# Patient Record
Sex: MALE | Employment: FULL TIME | URBAN - METROPOLITAN AREA
[De-identification: names, ages, dates, MRNs, and addresses within clinical notes are randomized per-mention and may not be internally consistent; named-entity substitution may affect disease eponyms.]

---

## 2021-08-16 ENCOUNTER — TRANSCRIBE ORDER (OUTPATIENT)
Dept: SCHEDULING | Age: 46
End: 2021-08-16

## 2021-08-16 DIAGNOSIS — M54.2 NECK PAIN: Primary | ICD-10-CM

## 2021-08-24 PROBLEM — M77.12 LEFT LATERAL EPICONDYLITIS: Status: ACTIVE | Noted: 2021-08-24

## 2021-09-18 ENCOUNTER — HOSPITAL ENCOUNTER (OUTPATIENT)
Dept: MRI IMAGING | Age: 46
Discharge: HOME | End: 2021-09-18
Attending: PHYSICIAN ASSISTANT
Payer: COMMERCIAL

## 2021-09-18 DIAGNOSIS — M54.2 NECK PAIN: ICD-10-CM

## 2021-09-18 PROCEDURE — 72141 MRI NECK SPINE W/O DYE: CPT

## 2021-10-05 PROBLEM — F32.9 MDD (MAJOR DEPRESSIVE DISORDER): Status: ACTIVE | Noted: 2017-01-24

## 2021-10-05 PROBLEM — M50.30 DDD (DEGENERATIVE DISC DISEASE), CERVICAL: Status: ACTIVE | Noted: 2021-10-05

## 2021-10-05 PROBLEM — M77.12 LEFT LATERAL EPICONDYLITIS: Status: RESOLVED | Noted: 2021-08-24 | Resolved: 2021-10-05

## 2021-10-05 PROBLEM — M51.34 DDD (DEGENERATIVE DISC DISEASE), THORACIC: Status: ACTIVE | Noted: 2021-10-05

## 2021-10-05 PROBLEM — M51.37 DDD (DEGENERATIVE DISC DISEASE), LUMBOSACRAL: Status: ACTIVE | Noted: 2021-10-05

## 2021-10-05 PROBLEM — K21.9 GERD (GASTROESOPHAGEAL REFLUX DISEASE): Status: ACTIVE | Noted: 2017-03-01

## 2021-10-05 PROBLEM — E11.9 DM2 (DIABETES MELLITUS, TYPE 2) (HCC): Status: ACTIVE | Noted: 2017-08-28

## 2021-11-24 ENCOUNTER — HOSPITAL ENCOUNTER (OUTPATIENT)
Dept: PHYSICAL THERAPY | Age: 46
Discharge: HOME | End: 2021-11-24
Attending: ORTHOPAEDIC SURGERY
Payer: COMMERCIAL

## 2021-11-24 DIAGNOSIS — M25.511 RIGHT SHOULDER PAIN, UNSPECIFIED CHRONICITY: ICD-10-CM

## 2021-11-24 DIAGNOSIS — M75.101 TEAR OF RIGHT ROTATOR CUFF, UNSPECIFIED TEAR EXTENT, UNSPECIFIED WHETHER TRAUMATIC: ICD-10-CM

## 2021-11-24 PROCEDURE — 97162 PT EVAL MOD COMPLEX 30 MIN: CPT

## 2021-11-24 PROCEDURE — 97110 THERAPEUTIC EXERCISES: CPT

## 2021-11-24 NOTE — PROGRESS NOTES
Macario Decker  : 1975  Payor: Ginette Cardona / Plan: [facility] / Product Type: PPO /  [address] [facility] at [address] [place] [address] [address] [place] [address], [facility], [address] [place] [address] Road  [phone]   [phone]                                                          Sheryl Montilla MD      OUTPATIENT PHYSICAL THERAPY: Daily Treatment Note 2021 Visit Count:  1    Tx Diagnosis:  Pain in Right Shoulder (M25.511)      Pre-treatment Symptoms/Complaints: See Initial Eval Dated 21 for more details. Pain: Initial:3/10  Medications Last Reviewed:  2021     Post Session: 3/10   Updated Objective Findings: See Initial Eval for more details. TREATMENT:   THERAPEUTIC EXERCISE: (15 minutes):  Exercises per grid below to improve mobility, strength and balance. Required minimal visual, verbal and manual cues to promote proper body alignment and promote proper body posture. Progressed resistance and complexity of movement as indicated. Date:  21 Date:   Date:     Activity/Exercise Parameters Parameters Parameters   Education HEP, POC, PT goals, anatomy/pathology     Rows [id number] lbs     scap retract with ER 3x10, blue band     Bicep curls with scap setting 3x10x8 lbs     Shoulder taps 3x10, EOB                       THERAPEUTIC ACTIVITY: ( 0 minutes): Activities per gid below to improve functional movement related mobility, strength and balance to improve neuro-muscular carryover to daily functional activities for improving patient's quality of life. Required visual, verbal and manual cues to promote proper body alignment and promote proper body posture/mechanics. Progressed resistance and complexity of movement as indicated.      Date:   Date:   Date:     Activity/Exercise Parameters Parameters Parameters                                                                               MANUAL THERAPY: (0 minutes): Joint mobilization, Soft tissue mobilization was utilized and necessary because of the patient's restricted joint motion and restricted motion of soft tissue mobility. Date  11/24/2021    Technique Used Grade  Level # Time(s) Effect while being performed                                                                 HEP Log Date 1.       2.     3.    4.    5.           [employer] Portal  Treatment/Session Summary:    Response to Treatment: Pt demonstrated understanding of POC and initial HEP. No increase in pain or adverse reactions. Communication/Consultation:  POC, HEP, PT goals, Faxed initial evaluation to MD.   Equipment provided today: HEP Handout   Recommendations/Intent for next treatment session:   Next visit will focus on Pain Science Education RTC strengthening periscapular strengthening, shoulder stability. Treatment Plan of Care Effective Dates: 11/24/2021 TO 1/23/2022 (60 days).   Frequency/Duration: 2 times a week for 60 Days             Total Treatment Billable Duration:   15  Rx plus Eval   PT Patient Time In/Time Out  Time In: 1640  Time Out: [address], PT    Future Appointments   Date Time Provider Ana M García   11/30/2021  4:30 PM [facility] Atkins, [facility]   12/6/2021  3:20 PM Miriam Trejo MD [facility] PRE PRE   12/7/2021  4:30 PM [facility]   12/9/2021  5:15 PM Thierno Atkins, PT [facility]   12/14/2021  4:30 PM Thierno Atkins, PT [facility]   12/21/2021  4:30 PM Thierno Atkins, PT [facility]   12/23/2021  4:30 PM Thierno Atkins, PT [facility]   12/28/2021  5:15 PM Thierno Atkins, PT [facility]   1/4/2022  9:00 AM MD GIOVANI Shields POA   1/4/2022  3:15 PM MD JUSTA Dumont

## 2021-11-24 NOTE — THERAPY EVALUATION
Tiff Grimm  : 1975      Payor: Alexander Ramirez / Plan: [facility] / Product Type: PPO /    [address], [place], [address] [place] [address] Road  [phone]   [phone]              OUTPATIENT PHYSICAL THERAPY:Initial Assessment: 2021    ICD-10: Treatment Diagnosis:   Pain in Right Shoulder (M25.511)              Precautions/Allergies:   Metformin   Fall Risk Score: 1 (? 5 = High Risk)  MD Orders: Eval and Treat  MEDICAL/REFERRING DIAGNOSIS:  Right shoulder pain, unspecified chronicity [M25.511]  Tear of right rotator cuff, unspecified tear extent, unspecified whether traumatic [M75.101]   DATE OF ONSET: 2020  REFERRING PHYSICIAN: Wendy Nelson MD  RETURN PHYSICIAN APPOINTMENT: TBD     INITIAL ASSESSMENT:   Mr. Ana Hernandes presents to physical therapy with decreased strength, ROM, joint mobility, functional mobility. These S/S are consistent with R rotator cuff tear, with features of instability. Patient will benefit from skilled physical therapy for manual therapeutic techniques (as appropriate), therapeutic exercises and activities, neuromuscular re-education, and comprehensive home exercises program to address current impairments and functional limitations. Tiff Grimm will benefit from skilled PT (medically necessary) in order to address above deficits affecting participation in basic ADLs and overall functional tolerance. PROBLEM LIST (Impacting functional limitations):  · Decreased Strength  · Decreased ADL/Functional Activities  · Increased Pain  · Decreased Activity Tolerance  · Increased Shortness of Breath  · Decreased Flexibility/Joint Mobility  · Decreased [place] with Home Exercise Program INTERVENTIONS PLANNED:  1. Cold  2. Family Education  3. Home Exercise Program (HEP)  4. Manual Therapy  5. Neuromuscular Re-education/Strengthening  6. Range of Motion (ROM)  7.  Therapeutic Activites  8. Therapeutic Exercise/Strengthening  9. Transfer Training  10. Ultrasound   TREATMENT PLAN:  Effective Dates: 11/24/2021 TO 1/23/2022 (60 days). Frequency/Duration: 2 times a week for 60 Days  GOALS: (Goals have been discussed and agreed upon with patient.)     Short-Term Goals~4 weeks  Goal Met   1. Kelley Lopez will report <=1/10 pain with don/doffing clothing as well as minimal/no difficulty. 1.  [] Date:   4. Kelley Lopez will show a greater than 8 point decrease on the DASH in order to show an increase in upper extremity function. 4.  [] Date:   5. Pt will improve R shoulder ER strength to 4/5 to improve ability to lift, carry, and throw for work. 5.  [] Date:   6.  6.  [] Date:         Long Term Goals~8 weeks Goal Met   1. Kelley Lopez will lift at least 75 lbs from floor to waist to demonstrate improved functional strength. 1.  [] Date:   2. Kelley Lopez will show a greater than 16 point decrease on the DASH in order to show an increase in upper extremity function 2. [] Date:   3. Kelley Lopez will press at least 30 lbs overhead to promote return to work duties. 3.  [] Date:   4.  4.  [] Date:            Outcome Measure: Tool Used: Disabilities of the Arm, Shoulder and Hand (DASH) Questionnaire - Quick Version  Score:  Initial: 31/55  Most Recent: X/55 (Date: -- )   Interpretation of Score: The DASH is designed to measure the activities of daily living in person's with upper extremity dysfunction or pain. Each section is scored on a 1-5 scale, 5 representing the greatest disability. The scores of each section are added together for a total score of 55. Medical Necessity:   · Skilled intervention continues to be required due to deficits and impairments seen upon initial evaluation affecting patient's participation in ADLs and functional tasks.     Reason for Services/Other Comments:  · Patient continues to require skilled intervention due to deficits and impairments seen upon initial evaluation affecting patient's participation in ADLs and functional tasks. Total Treatment Duration:  PT Patient Time In/Time Out  Time In: 1640  Time Out: 1722    Rehabilitation Potential For Stated Goals: good  Regarding Daniela Sierra's therapy, I certify that the treatment plan above will be carried out by a therapist or under their direction. Thank you for this referral,  Roc Powell, PT     Referring Physician Signature: Jazmyn Sanz MD                        HISTORY:   History of Present Injury/Illness (Reason for Referral):  Pt reports history of R shoulder pain starting in 2016. He states the pain in his shoulder improved after taking a break from work but that it started hurting again in 2020. He states he hasn't done much with his shoulder since having to restrict his activity at work due to other injuries. He's noted significant weakness to his shoulder since August 2021. He notes intermittent tingling to his R thumb that appears to coincide with prolonged computer work. Pt also notes constant pain in his neck, along with muscle spasms with heavy work. -Present symptoms/complaints (on day of evaluation)  Pain Scale:   ·   · Worst: 3/10    · Aggravating factors: Lifting, Carrying, Overhead activities, push/pull and throwing  · Relieving factors: rest by side  · Irritability: Medium (Onset of pain is equal to alleviation)    Dominant Side:  right  Past Medical History/Comorbidities:   Mr. Gabbie Chamberlain  has no past medical history on file. Mr. Gabbie Chamberlain  has a past surgical history that includes hx orthopaedic. Social History/Living Environment:      Prior Level of Function/Work/Activity:  Normal  Other Clinical Tests:         MRI Positive for partial RTC tear  Current Medications:    Current Outpatient Medications:     meloxicam (Mobic) 15 mg tablet, Take 1 Tablet by mouth daily for 30 days. , Disp: 30 Tablet, Rfl: 1    empagliflozin-linagliptin (Glyxambi) 25-5 mg tab, 1 tab daily with breakfast, Disp: 90 Tablet, Rfl: 0    losartan (COZAAR) 100 mg tablet, Take 1 Tablet by mouth nightly., Disp: 90 Tablet, Rfl: 0    amLODIPine (NORVASC) 10 mg tablet, Take 1 Tablet by mouth daily. , Disp: 90 Tablet, Rfl: 0    albuterol (PROVENTIL HFA, VENTOLIN HFA, PROAIR HFA) 90 mcg/actuation inhaler, Take 1-2 Puffs by inhalation every six (6) hours as needed for Wheezing., Disp: 1 Each, Rfl: 1    montelukast (SINGULAIR) 10 mg tablet, Take 1 Tablet by mouth daily. , Disp: 90 Tablet, Rfl: 1    aspirin 81 mg chewable tablet, Take 81 mg by mouth daily. Take one daily, Disp: , Rfl:         Ambulatory/Rehab Services H2 Model Falls Risk Assessment    Risk Factors:       (1)  Gender [Male] Ability to Rise from Chair:       (0)  Ability to rise in a single movement    Falls Prevention Plan:       No modifications necessary   Total: (5 or greater = High Risk): 1    ©2010 [facility] of [facility]. All Rights Reserved. [facility] [employer] Patent #[id number]. Federal Law prohibits the replication, distribution or use without written permission from [facility] of [address] [place] [address]       Date Last Reviewed:  11/24/2021   Number of Personal Factors/Comorbidities that affect the Plan of Care: 1-2: MODERATE COMPLEXITY   EXAMINATION:   Observation/Orthostatic Postural Assessment: Forward Head and Rounded Shoulders  Palpation:          No TTP around shoulder or with AC compression    ROM:  Cervical and shoulder ROM full. Subjective weakness felt in R shoulder with aching and popping.    AROM/PROM         Joint: Eval Date: 11/24/2021  Re-Assess Date:  Re-Assess Date:    ACTIVE ROM (standing) RIGHT LEFT RIGHT LEFT RIGHT LEFT   Shoulder Flexion 180 180           Shoulder Abduction 170 170           Shoulder Internal Rotation (Apley's) T2 T2           Shoulder External Rotation (Apley's) TLJ TLJ           Elbow ROM             PASSIVE ROM (supine)             Shoulder Flexion 180 180           Shoulder Abduction 180 180           Shoulder Internal Rotation 90 90           Shoulder External Rotation 90 [facility]                                               Strength:    Joint: Eval Date: 11/24/2021  Re-Assess Date:  Re-Assess Date:     RIGHT LEFT RIGHT LEFT RIGHT LEFT   Shoulder Flexion 4-/5 with pain over AC joint 5/5           Shoulder Abduction  (C5) 4-/5 with pain over AC joint 5/5           Shoulder Internal Rotation 4/5, pain aleksey 5/5           Shoulder External Rotation 4-/5, pain free 4-/5, limited by pain in elbow           Elbow Flexion  (C6) 4/5 5/5           Elbow Extension (C7) 5/5 5-/5           Wrist Flexion (C7) /5 /5           Wrist Extension (C6)             Resisted Thumb Extension/Finger Abduction (C8/T1)             Resisted Cervical Rotation (C1):             Resisted Shoulder Shrug (C2, 3, 4):               Strength                                                        Manual:  Joint Directon Grade Treatment Effect   R GHJ Posterior Inferior II Increased Symptoms - reported apprehension of dislocation, crepitus noted                   Special Tests:     None indicated at IE    Neurological Screen:    Radiating symptoms? Yes=glenohumeral region  Functional Mobility:     able to perform ADLs independently. Reports difficulty lifting objects     Body Structures Involved:  1. Bones  2. Joints  3. Muscles  4. Ligaments Body Functions Affected:  1. Sensory/Pain  2. Neuromusculoskeletal  3. Movement Related Activities and Participation Affected:  1. Mobility  2.  Self Care   Number of elements that affect the Plan of Care: 3: MODERATE COMPLEXITY   CLINICAL PRESENTATION:   Presentation: Evolving clinical presentation with changing clinical characteristics: MODERATE COMPLEXITY   CLINICAL DECISION MAKING:      Use of outcome tool(s) and clinical judgement create a POC that gives a: Questionable prediction of patient's progress: MODERATE COMPLEXITY     See associated treatment note for treatment provided today.     Future Appointments   Date Time Provider Ana M Raquel   11/30/2021  4:30 PM Jakob Hameed, [place] [facility]   12/6/2021  3:20 PM Sheri Douglas MD [facility] PRE PRE   12/7/2021  4:30 PM Jakob Hameed, PT [facility]   12/9/2021  5:15 PM Jakob Hameed, PT [facility]   12/14/2021  4:30 PM Jakob Hameed, PT [facility]   12/21/2021  4:30 PM Jakob Hameed, PT [facility]   12/23/2021  4:30 PM Jakob Hameed, PT [facility]   12/28/2021  5:15 PM Jakob Hameed, PT [facility]   1/4/2022  9:00 AM Susan Hale MD POAP POA   1/4/2022  3:15 PM Francia Duran MD [facility] POAI POA         Bisi Yuen, PT

## 2021-11-30 ENCOUNTER — HOSPITAL ENCOUNTER (OUTPATIENT)
Dept: PHYSICAL THERAPY | Age: 46
Discharge: HOME | End: 2021-11-30
Attending: ORTHOPAEDIC SURGERY
Payer: COMMERCIAL

## 2021-11-30 PROCEDURE — 97110 THERAPEUTIC EXERCISES: CPT

## 2021-11-30 NOTE — PROGRESS NOTES
Kelley Lopez  : 1975  Payor: Coreen Brothers / Plan: [facility] / Product Type: PPO /  [place] at [address] [facility] [address] [place] [address], [facility], [address] [place] [address] Road  [phone]   [phone]                                                          Fritz Elaine MD      OUTPATIENT PHYSICAL THERAPY: Daily Treatment Note 2021 Visit Count:  2    Tx Diagnosis:  Pain in Right Shoulder (M25.511)      Pre-treatment Symptoms/Complaints: Feels like he irritated his shoulder by picking up bags of concrete and digging over the weekend. States it feels like it's back to normal now but he didn't get a chance to try the exercises at home yet. Pain: Initial:3/10  Medications Last Reviewed:  2021     Post Session: 3/10   Updated Objective Findings: See Initial Eval for more details. TREATMENT:   THERAPEUTIC EXERCISE: (46 minutes):  Exercises per grid below to improve mobility, strength and balance. Required minimal visual, verbal and manual cues to promote proper body alignment and promote proper body posture. Progressed resistance and complexity of movement as indicated. Date:  21 Date:  21 Date:     Activity/Exercise Parameters Parameters Parameters   Education HEP, POC, PT goals, anatomy/pathology     Rows [id number] lbs 5m52x02 lbs    scap retract with ER 3x10, blue band 3x10, blue band    Bicep curls with scap setting 3x10x8 lbs 3x10x8 lbs    Shoulder taps 3x10, EOB 3x10, EOB    UBE  4 min forward, 4 min backward; 1 mile    Bazan's carries   15 lbs, 3 large laps    punches  3x10, yellow band for ER iso, standing    Prone y's  With dowel, 3x10    Prone t's  Thumbs up, 3x10    Prone I's  3x10    Lat pull downs, supinated   [id number] lbs    Child's pose lat stretch  3x30 sec          THERAPEUTIC ACTIVITY: ( 0 minutes):  Activities per gid below to improve functional movement related mobility, strength and balance to improve neuro-muscular carryover to daily functional activities for improving patient's quality of life. Required visual, verbal and manual cues to promote proper body alignment and promote proper body posture/mechanics. Progressed resistance and complexity of movement as indicated. Date:   Date:   Date:     Activity/Exercise Parameters Parameters Parameters                                                                               MANUAL THERAPY: (0 minutes): Joint mobilization, Soft tissue mobilization was utilized and necessary because of the patient's restricted joint motion and restricted motion of soft tissue mobility. Date  11/30/2021    Technique Used Grade  Level # Time(s) Effect while being performed                                                                 HEP Log Date 1.       2.     3.    4.    5.           [employer] Portal  Treatment/Session Summary:    Response to Treatment: Continued shoulder stability and periscapular strengthening with good tolerance. Pt most challenged by prone Y's. Pt reported mild increase in pain that eased with reduced load. Communication/Consultation:  POC, HEP, PT goals, Faxed initial evaluation to MD.   Equipment provided today: HEP Handout   Recommendations/Intent for next treatment session:   Next visit will focus on Pain Science Education RTC strengthening periscapular strengthening, shoulder stability. Treatment Plan of Care Effective Dates: 11/30/2021 TO 1/23/2022 (60 days).   Frequency/Duration: 2 times a week for 60 Days             Total Treatment Billable Duration:   43  Rx   PT Patient Time In/Time Out  Time In: 1640  Time Out: Vidya Mcgowan, PT    Future Appointments   Date Time Provider Ana M García   12/1/2021  2:40 PM Carol Contreras NP [facility] POA   12/6/2021  3:20 PM Selena Aleman MD [facility] PRE PRE   12/7/2021  4:45 PM Lawson Holt PT [facility]   12/9/2021  5:15 PM [facility]   12/14/2021  4:45 PM Boaz [facility]ibhavs, [facility]   12/21/2021  4:45 PM Boaz Vaibhavs, PT [facility]   12/23/2021  4:45 PM Boaz Espositos, PT [facility]   12/28/2021  5:15 PM Boaz Mohs, [facility]   1/4/2022  9:00 AM Shayna Herzog MD POAP POA   1/4/2022  3:15 PM Garrison Meehan MD [facility]SA LIGIA POA

## 2021-12-07 ENCOUNTER — APPOINTMENT (OUTPATIENT)
Dept: PHYSICAL THERAPY | Age: 46
End: 2021-12-07
Attending: ORTHOPAEDIC SURGERY
Payer: COMMERCIAL

## 2021-12-09 ENCOUNTER — HOSPITAL ENCOUNTER (OUTPATIENT)
Dept: PHYSICAL THERAPY | Age: 46
Discharge: HOME | End: 2021-12-09
Attending: ORTHOPAEDIC SURGERY
Payer: COMMERCIAL

## 2021-12-09 PROCEDURE — 97110 THERAPEUTIC EXERCISES: CPT

## 2021-12-09 NOTE — PROGRESS NOTES
Tanvi Coma  : 1975  Payor: Axiskatt Houe / Plan: [facility] / Product Type: PPO /  [address] [facility] at [address] [place] [address] [address] [place] [address], [facility], [address] [place] [address] Road  [phone]   [phone]                                                          Tiburcio Houston MD      OUTPATIENT PHYSICAL THERAPY: Daily Treatment Note 2021 Visit Count:  3    Tx Diagnosis:  Pain in Right Shoulder (M25.511)      Pre-treatment Symptoms/Complaints: Feels like his shoulder's about the same. Reports compliance with HEP. Feels like the spot on his side has gotten a little bit better. Pt also states he's having surgery on his R elbow for tennis elbow at some point, but it's not scheduled yet. Pain: Initial:3/10  Medications Last Reviewed:  2021     Post Session: 3/10   Updated Objective Findings: See Initial Eval for more details. TREATMENT:   THERAPEUTIC EXERCISE: (40 minutes):  Exercises per grid below to improve mobility, strength and balance. Required minimal visual, verbal and manual cues to promote proper body alignment and promote proper body posture. Progressed resistance and complexity of movement as indicated.      Date:  21 Date:  21 Date:  21   Activity/Exercise Parameters Parameters Parameters   Education HEP, POC, PT goals, anatomy/pathology     Rows [id number] lbs [id number] lbs    scap retract with ER 3x10, blue band 3x10, blue band    Bicep curls with scap setting 3x10x8 lbs 3x10x8 lbs    Shoulder taps 3x10, EOB 3x10, EOB    UBE  4 min forward, 4 min backward; 1 mile 4 min forward, 4 min backward; 1 mile   Bazan's carries   15 lbs, 3 large laps 20 lbs, 3 large laps   punches  3x10, yellow band for ER iso, standing 3x10, yellow band for ER iso, standing   Prone y's  With dowel, 3x10 With dowel, 3x10   Prone t's  Thumbs up, 3x10 Thumbs up, 3x10x2 lbs   Prone I's  3x10 3x10   Lat pull downs, supinated   [id number] lbs [id number] lbs Child's pose lat stretch  3x30 sec    Wall walks    2x10, with ER iso   Rhythmic stabilization   Supine, 3 way, 2x10, blue tubing         THERAPEUTIC ACTIVITY: ( 0 minutes): Activities per gid below to improve functional movement related mobility, strength and balance to improve neuro-muscular carryover to daily functional activities for improving patient's quality of life. Required visual, verbal and manual cues to promote proper body alignment and promote proper body posture/mechanics. Progressed resistance and complexity of movement as indicated. Date:   Date:   Date:     Activity/Exercise Parameters Parameters Parameters                                                                               MANUAL THERAPY: (0 minutes): Joint mobilization, Soft tissue mobilization was utilized and necessary because of the patient's restricted joint motion and restricted motion of soft tissue mobility. Date  12/9/2021    Technique Used Grade  Level # Time(s) Effect while being performed                                                                 HEP Log Date 1. Rhythmic stab, rows, shoulder taps, bicep curls with scap setting 12/9/21   2.     3.    4.    5.           [employer] Portal  Treatment/Session Summary:    Response to Treatment: Continued working on periscapular strengthening. Due to good tolerance with overhead activities today, added ring assisted overhead press. No increase in pain noted throughout, though pt reported feeling of the shoulder wanting to \"give out\" but that it felt different than muscular fatigue. Communication/Consultation:  POC, HEP, PT goals, Faxed initial evaluation to MD.   Equipment provided today: HEP Handout   Recommendations/Intent for next treatment session:   Next visit will focus on Pain Science Education RTC strengthening periscapular strengthening, shoulder stability.  Magali SRIVASTAVA with band for lat engagement         Treatment Plan of Care Effective Dates: 12/9/2021 TO 1/23/2022 (60 days).   Frequency/Duration: 2 times a week for 60 Days             Total Treatment Billable Duration:   40  Min Rx , 5 unbilled  PT Patient Time In/Time Out  Time In: 8622  Time Out: 0600  Loreen Essex, PT    Future Appointments   Date Time Provider Ana M García   12/21/2021  4:45 PM Noaluciana Johnson, PT [facility]   12/23/2021  4:45 PM Noaluciana Johnson, PT [facility]   12/28/2021  5:15 PM Noa Elizabeth, PT [facility]   1/4/2022  3:15 PM MD JUSTA Carrion

## 2021-12-13 ENCOUNTER — HOSPITAL ENCOUNTER (OUTPATIENT)
Dept: PHYSICAL THERAPY | Age: 46
Discharge: HOME | End: 2021-12-13
Attending: ORTHOPAEDIC SURGERY
Payer: COMMERCIAL

## 2021-12-13 PROCEDURE — 97110 THERAPEUTIC EXERCISES: CPT

## 2021-12-13 NOTE — PROGRESS NOTES
Phill Clark  : 1975  Payor: Amanda Mcgowan / [place]: [place] [facility] / Product Type: PPO /  [address] at [address] [place]. [address] [place] [address], [facility], [address] [place] [address] Road  [phone]   [phone]                                                          Yari Powell MD      OUTPATIENT PHYSICAL THERAPY: Daily Treatment Note 2021 Visit Count:  4    Tx Diagnosis:  Pain in Right Shoulder (M25.511)      Pre-treatment Symptoms/Complaints: States he was a little sore after last session. Feels like he's a little bit stronger - he was able to lift an old microwave up into his truck with less trouble than usual.    Pain: Initial:3/10  Medications Last Reviewed:  2021     Post Session: 3/10   Updated Objective Findings: See Initial Eval for more details. TREATMENT:   THERAPEUTIC EXERCISE: (39 minutes):  Exercises per grid below to improve mobility, strength and balance. Required minimal visual, verbal and manual cues to promote proper body alignment and promote proper body posture. Progressed resistance and complexity of movement as indicated.      Date:  21 Date:  21 Date:  21 Date  21   Activity/Exercise Parameters Parameters Parameters    Education HEP, POC, PT goals, anatomy/pathology      Rows [id number] lbs [id number] lbs  [id number] lbs   scap retract with ER 3x10, blue band 3x10, blue band     Bicep curls with scap setting 3x10x8 lbs 3x10x8 lbs     Shoulder taps 3x10, EOB 3x10, EOB     UBE  4 min forward, 4 min backward; 1 mile 4 min forward, 4 min backward; 1 mile 4/4, twin peaks, level 3   Farmer's carries   15 lbs, 3 large laps 20 lbs, 3 large laps 25 lbs, 3 large laps   punches  3x10, yellow band for ER iso, standing 3x10, yellow band for ER iso, standing    Prone y's  With dowel, 3x10 With dowel, 3x10 With dowel, 3x10   Prone t's  Thumbs up, 3x10 Thumbs up, 3x10x2 lbs Thumbs up, 3x10x2 lbs   Prone I's  3x10 3x10 3x10x2 lbs Lat pull downs, supinated   5x47n45 lbs 7e91g14 lbs 5t27p19 lbs   Child's pose lat stretch  3x30 sec     Wall walks    2x10, with ER iso 2x10, with ER iso   Rhythmic stabilization   Supine, 3 way, 2x10, blue tubing Standing, 3 way, 3x10, blue tubing   Deadlift    Empty training bar with red band, 3x10         THERAPEUTIC ACTIVITY: ( 0 minutes): Activities per gid below to improve functional movement related mobility, strength and balance to improve neuro-muscular carryover to daily functional activities for improving patient's quality of life. Required visual, verbal and manual cues to promote proper body alignment and promote proper body posture/mechanics. Progressed resistance and complexity of movement as indicated. Date:   Date:   Date:     Activity/Exercise Parameters Parameters Parameters                                                                               MANUAL THERAPY: (0 minutes): Joint mobilization, Soft tissue mobilization was utilized and necessary because of the patient's restricted joint motion and restricted motion of soft tissue mobility. Date  12/13/2021    Technique Used Grade  Level # Time(s) Effect while being performed                                                                 HEP Log Date 1. Rhythmic stab, rows, shoulder taps, bicep curls with scap setting 12/9/21   2.     3.    4.    5.           [employer] Portal  Treatment/Session Summary:    Response to Treatment: Continued working on periscapular strengthening. Added light deadlifts today with tactile cueing for lat engagement. Pt challenged throughout session but reported no negative signs or symptoms.     Communication/Consultation:  POC, HEP, PT goals, Faxed initial evaluation to MD.   Equipment provided today: HEP Handout   Recommendations/Intent for next treatment session:   Next visit will focus on Pain Science Education RTC strengthening periscapular strengthening, shoulder stability. Magali DL with band for lat engagement         Treatment Plan of Care Effective Dates: 12/13/2021 TO 1/23/2022 (60 days).   Frequency/Duration: 2 times a week for 60 Days             Total Treatment Billable Duration:   44  Min Rx  PT Patient Time In/Time Out  Time In: 6906  Time Out: 30[facility],     Future Appointments   Date Time Provider Ana M García   12/21/2021  4:45 PM Lendon Pencil, PT [facility] AND [facility]   12/23/2021  4:45 PM Lendon Pencil, PT [facility]   12/28/2021  5:15 PM Lendon Pendaniel, PT [facility]   1/4/2022  3:15 PM Nasir Lawson MD [facility] LIGIA GARCIA

## 2021-12-14 ENCOUNTER — APPOINTMENT (OUTPATIENT)
Dept: PHYSICAL THERAPY | Age: 46
End: 2021-12-14
Attending: ORTHOPAEDIC SURGERY
Payer: COMMERCIAL

## 2021-12-21 ENCOUNTER — HOSPITAL ENCOUNTER (OUTPATIENT)
Dept: PHYSICAL THERAPY | Age: 46
Discharge: HOME | End: 2021-12-21
Attending: ORTHOPAEDIC SURGERY
Payer: COMMERCIAL

## 2021-12-21 NOTE — PROGRESS NOTES
Danny Davsi  : 1975  Payor: Samuel Salas / [place] [facility] / Product Type: PPO /  [address] [place]. [address] [place] [address], [facility], [address] [place] [address] Road  [phone]   [phone]        OUTPATIENT DAILY NOTE    NAME: Danny Davis    DATE: 2021    Patient Cancelled physical therapy appointment today  due to [place] exposure. Will follow up next appointment. Pt is being tested and will call back to cancel if positive.     Etta Segura PT    Future Appointments   Date Time Provider Ana M García   2021  7:00 PM Emir Bains [facility]   2021  4:45 PM Veena Mcmillan PT [facility] AND [facility]   2021  5:15 PM Veena Mcmillan PT [facility] AND [facility]   2022  3:15 PM MD JUSTA Retana

## 2021-12-28 ENCOUNTER — HOSPITAL ENCOUNTER (OUTPATIENT)
Dept: PHYSICAL THERAPY | Age: 46
Discharge: HOME | End: 2021-12-28
Attending: ORTHOPAEDIC SURGERY
Payer: COMMERCIAL

## 2021-12-28 PROCEDURE — 97110 THERAPEUTIC EXERCISES: CPT

## 2021-12-28 NOTE — PROGRESS NOTES
Ibis Dodd  : 1975  Payor: Sirisha Cornell / [place] [facility] / Product Type: PPO /  [address] [facility] at [address] [place] [address] [address] [place] [address], [facility], [address] [place] [address]  [phone]   [phone]                                                          Delilah Hodgkin, MD      OUTPATIENT PHYSICAL THERAPY: Daily Treatment Note 2021 Visit Count:  5    Tx Diagnosis:  Pain in Right Shoulder (M25.511)      Pre-treatment Symptoms/Complaints: Felt sore in his hips after last session. Had some soreness in his shoulders but no increased pain. Pain: Initial:3/10  Medications Last Reviewed:  2021     Post Session: 3/10   Updated Objective Findings: See Initial Eval for more details. TREATMENT:   THERAPEUTIC EXERCISE: (43 minutes):  Exercises per grid below to improve mobility, strength and balance. Required minimal visual, verbal and manual cues to promote proper body alignment and promote proper body posture. Progressed resistance and complexity of movement as indicated.      Date:  21 Date:  21 Date:  21 Date  21 Date  21 Date  21   Activity/Exercise Parameters Parameters Parameters      Education HEP, POC, PT goals, anatomy/pathology        Rows [id number] lbs [id number] lbs  2k87r56 lbs     scap retract with ER 3x10, blue band 3x10, blue band       Bicep curls with scap setting 3x10x8 lbs 3x10x8 lbs       Shoulder taps 3x10, EOB 3x10, EOB       UBE  4 min forward, 4 min backward; 1 mile 4 min forward, 4 min backward; 1 mile 4/4, twin peaks, level 3 4/4, twin peaks, level 4 4/4, twin peaks, level 4   Farmer's carries   15 lbs, 3 large laps 20 lbs, 3 large laps 25 lbs, 3 large laps 25 lbs, 3 large laps, also OH carry 2 laps 25 lbs, 3 large laps, also OH carry 2 laps   punches  3x10, yellow band for ER iso, standing 3x10, yellow band for ER iso, standing      Prone y's  With dowel, 3x10 With dowel, 3x10 With dowel, 3x10 3x10, yellow band   Prone t's  Thumbs up, 3x10 Thumbs up, 3x10x2 lbs Thumbs up, 3x10x2 lbs  3x10x3 lbs   Prone I's  3x10 3x10 3x10x2 lbs  3x10x3 lbs   Lat pull downs, supinated   [id number] lbs [id number] lbs 0t85l83 lbs  4h51p73 lbs   Child's pose lat stretch  3x30 sec    10x10 sec holds, at cable column   Wall walks    2x10, with ER iso 2x10, with ER iso     Rhythmic stabilization   Supine, 3 way, 2x10, blue tubing Standing, 3 way, 3x10, blue tubing     Deadlift    Empty training bar with red band, 3x10     Rack pulls     2a55b44 lbs 1r61x49 lbs   Ring assisted overhead press     0l12o18 lbs 5d61q23 lbs         THERAPEUTIC ACTIVITY: ( 0 minutes): Activities per gid below to improve functional movement related mobility, strength and balance to improve neuro-muscular carryover to daily functional activities for improving patient's quality of life. Required visual, verbal and manual cues to promote proper body alignment and promote proper body posture/mechanics. Progressed resistance and complexity of movement as indicated. Date:   Date:   Date:     Activity/Exercise Parameters Parameters Parameters                                                                               MANUAL THERAPY: (0 minutes): Joint mobilization, Soft tissue mobilization was utilized and necessary because of the patient's restricted joint motion and restricted motion of soft tissue mobility. Date  12/28/2021    Technique Used Grade  Level # Time(s) Effect while being performed                                                                 HEP Log Date 1. Rhythmic stab, rows, shoulder taps, bicep curls with scap setting 12/9/21   2.     3.    4.    5.           [employer] Portal  Treatment/Session Summary:    Response to Treatment: Progressed resistance with established periscapular and functional exercises as noted above with good tolerance.  Pt did have more trouble with the spot on his R lat today, and this was addressed with lat stretching followed by strengthening. Communication/Consultation:  POC, HEP, PT goals, Faxed initial evaluation to MD.   Equipment provided today: HEP Handout   Recommendations/Intent for next treatment session:   Next visit will focus on Pain Science Education RTC strengthening periscapular strengthening, shoulder stability. Treatment Plan of Care Effective Dates: 12/13/2021 TO 1/23/2022 (60 days).   Frequency/Duration: 2 times a week for 60 Days             Total Treatment Billable Duration:   37  Min Rx  PT Patient Time In/Time Out  Time In: 6964  Time Out: 21[facility], PT    Future Appointments   Date Time Provider Ana M García   1/4/2022  3:15 PM MD JUSTA Tran

## 2022-01-06 ENCOUNTER — HOSPITAL ENCOUNTER (OUTPATIENT)
Dept: PHYSICAL THERAPY | Age: 47
Discharge: HOME | End: 2022-01-06
Payer: COMMERCIAL

## 2022-01-06 PROCEDURE — 97110 THERAPEUTIC EXERCISES: CPT

## 2022-01-06 NOTE — PROGRESS NOTES
Zach Zarate  : 1975      Payor: Marybel Nolasco / [place]: [place] [facility] / Product Type: PPO /    [address] [facility] at [address] [place] [address]. [facility], [address], [facility], [address] [place] [address]  [phone]   [phone]              OUTPATIENT PHYSICAL THERAPY:Recertification:     ICD-10: Treatment Diagnosis:   Pain in Right Shoulder (M25.511)  Pain in thoracic spine (M54.6)              Precautions/Allergies:   Metformin   Fall Risk Score: 1 (? 5 = High Risk)  MD Orders: Eval and Treat  MEDICAL/REFERRING DIAGNOSIS:  No admission diagnoses are documented for this encounter. DATE OF ONSET: 2020  REFERRING PHYSICIAN: Kendall Herzog MD  RETURN PHYSICIAN APPOINTMENT: TBD     ASSESSMENT:   Mr. Isis Trujillo has demonstrated significant improvements in R shoulder strength, pain reduction, and subjective improvements in function. However, pt continues to lack adequate strength to support usual lifting activities required for work. Pt's thoracic spine was also evaluated more thoroughly today due to new order from physician. Pt without reproduction of familiar pain or weakness with joint mobility testing or palpation throughout thoracic paraspinals, rhomboids, or lat. Due to nature of complaint, pt's reported intermittent pain is likely myofascial in nature and pt may benefit from increased work on thoracic mobility in addition to strengthening exercises already established for current POC. Zach Zarate will benefit from skilled PT (medically necessary) in order to address above deficits affecting participation in basic ADLs and overall functional tolerance.     PROBLEM LIST (Impacting functional limitations):  · Decreased Strength  · Decreased ADL/Functional Activities  · Increased Pain  · Decreased Activity Tolerance  · Increased Shortness of Breath  · Decreased Flexibility/Joint Mobility  · Decreased [place] with Home Exercise Program INTERVENTIONS PLANNED:  1. Cold  2. Family Education  3. Home Exercise Program (HEP)  4. Manual Therapy  5. Neuromuscular Re-education/Strengthening  6. Range of Motion (ROM)  7. Therapeutic Activites  8. Therapeutic Exercise/Strengthening  9. Transfer Training  10. Ultrasound   TREATMENT PLAN:  Effective Dates: 1/6/2022 TO 3/3/2022 (60 days). Frequency/Duration: 2 times a week for 60 Days  GOALS: (Goals have been discussed and agreed upon with patient.)      Short-Term Goals~4 weeks  Goal Met   1. Laurie Ramp will report <=1/10 pain with don/doffing clothing as well as minimal/no difficulty. 1.  [x] Date: 1/6/22   4. Laurie Ramp will show a greater than 8 point decrease on the DASH in order to show an increase in upper extremity function. 4.  [x] Date: 1/6/22   5. Pt will improve R shoulder ER strength to 4/5 to improve ability to lift, carry, and throw for work. 5.  [] Date:    6.  6.  [] Date:         Long Term Goals~8 weeks Goal Met   1. Laurie Ramp will lift at least 75 lbs from floor to waist to demonstrate improved functional strength. 1.  [] Date:   2. Laurie Ramp will show a greater than 16 point decrease on the DASH in order to show an increase in upper extremity function 2. [] Date:   3. Laurie Ramp will press at least 30 lbs overhead to promote return to work duties. 3.  [] Date:   4.  4.  [] Date:            Outcome Measure: Tool Used: Disabilities of the Arm, Shoulder and Hand (DASH) Questionnaire - Quick Version  Score:  Initial: 31/55  Most Recent: 22/55 (Date: 1/6/22 )   Interpretation of Score: The DASH is designed to measure the activities of daily living in person's with upper extremity dysfunction or pain. Each section is scored on a 1-5 scale, 5 representing the greatest disability. The scores of each section are added together for a total score of 55.       Medical Necessity:   · Skilled intervention continues to be required due to deficits and impairments seen upon initial evaluation affecting patient's participation in ADLs and functional tasks. Reason for Services/Other Comments:  · Patient continues to require skilled intervention due to deficits and impairments seen upon initial evaluation affecting patient's participation in ADLs and functional tasks. Total Treatment Duration:  PT Patient Time In/Time Out  Time In: 1800  Time Out: 1715    Rehabilitation Potential For Stated Goals: good  Regarding Daniela Sierra's therapy, I certify that the treatment plan above will be carried out by a therapist or under their direction. Thank you for this referral,  Mary Floyd, PT     Referring Physician Signature: Travis Bill MD                        HISTORY:   History of Present Injury/Illness (Reason for Referral):  Pt reports history of R shoulder pain starting in 2016. He states the pain in his shoulder improved after taking a break from work but that it started hurting again in 2020. He states he hasn't done much with his shoulder since having to restrict his activity at work due to other injuries. He's noted significant weakness to his shoulder since August 2021. He notes intermittent tingling to his R thumb that appears to coincide with prolonged computer work. Pt also notes constant pain in his neck, along with muscle spasms with heavy work. Progress Report 1/6/22: Pt continues to report intermittent pain in his R shoulder with lifting overhead, though he notes it has improved since starting therapy. He also sought a new referral for his back - feels like his mid-back is weak and is contributing to his difficulty lifting, has pain on the R side of his mid back intermittently. Feels like there's \"a line\" on the right side of his mid back.      -Present symptoms/complaints (on day of evaluation)  Pain Scale:   ·   · Worst: 3/10    · Aggravating factors: Lifting, Carrying, Overhead activities, push/pull and throwing  · Relieving factors: rest by side  · Irritability: Medium (Onset of pain is equal to alleviation)    Dominant Side:  right  Past Medical History/Comorbidities:   Mr. Dulce Alas  has no past medical history on file. Mr. Dulce Alas  has a past surgical history that includes hx orthopaedic. Social History/Living Environment:      Prior Level of Function/Work/Activity:  Normal  Other Clinical Tests:         MRI Positive for partial RTC tear  Current Medications:    Current Outpatient Medications:     meloxicam (Mobic) 15 mg tablet, Take 1 Tablet by mouth daily for 30 days. , Disp: 30 Tablet, Rfl: 0    empagliflozin-linagliptin (Glyxambi) 25-5 mg tab, 1 tab daily with breakfast (Patient not taking: Reported on 1/4/2022), Disp: 90 Tablet, Rfl: 0    losartan (COZAAR) 100 mg tablet, Take 1 Tablet by mouth nightly., Disp: 90 Tablet, Rfl: 0    amLODIPine (NORVASC) 10 mg tablet, Take 1 Tablet by mouth daily. , Disp: 90 Tablet, Rfl: 0    albuterol (PROVENTIL HFA, VENTOLIN HFA, PROAIR HFA) 90 mcg/actuation inhaler, Take 1-2 Puffs by inhalation every six (6) hours as needed for Wheezing., Disp: 1 Each, Rfl: 1    montelukast (SINGULAIR) 10 mg tablet, Take 1 Tablet by mouth daily. , Disp: 90 Tablet, Rfl: 1    aspirin 81 mg chewable tablet, Take 81 mg by mouth daily. Take one daily, Disp: , Rfl:         Ambulatory/Rehab Services H2 Model Falls Risk Assessment    Risk Factors:       (1)  Gender [Male] Ability to Rise from Chair:       (0)  Ability to rise in a single movement    Falls Prevention Plan:       No modifications necessary   Total: (5 or greater = High Risk): 1    ©2010 [facility]. All Rights Reserved. [facility] States Patent #3,020,524. Federal Law prohibits the replication, distribution or use without written permission from [employer]       Date Last Reviewed:  1/6/2022   Number of Personal Factors/Comorbidities that affect the Plan of Care: 1-2: MODERATE COMPLEXITY   EXAMINATION:   Observation/Orthostatic Postural Assessment:     Forward Head and Rounded Shoulders  Palpation: No TTP around shoulder or with AC compression. No trigger points noted throughout thoracic paraspinals, rhomboids, or R lat. ROM:  Cervical and shoulder ROM full. Subjective weakness felt in R shoulder with aching and popping.    AROM/PROM         Joint: Eval Date: 11/24/2021  Re-Assess Date:  Re-Assess Date:    ACTIVE ROM (standing) RIGHT LEFT RIGHT LEFT RIGHT LEFT   Shoulder Flexion 180 180           Shoulder Abduction 170 170           Shoulder Internal Rotation (Apley's) T2 T2           Shoulder External Rotation (Apley's) TLJ TLJ           Elbow ROM             PASSIVE ROM (supine)             Shoulder Flexion 180 180           Shoulder Abduction 180 180           Shoulder Internal Rotation 90 90           Shoulder External Rotation [address]             [place]                         Strength:    Joint: Eval Date: 11/24/2021  Re-Assess Date:  1/6/22  Re-Assess Date:     RIGHT LEFT RIGHT LEFT RIGHT LEFT   Shoulder Flexion 4-/5 with pain over AC joint 5/5 4/5, no pain         Shoulder Abduction  (C5) 4-/5 with pain over AC joint 5/5 4-/5, no pain         Shoulder Internal Rotation 4/5, pain free 5/5 4/5, no pain         Shoulder External Rotation 4-/5, pain free 4-/5, limited by pain in elbow 4-/5, no pain         Elbow Flexion  (C6) 4/5 5/5 4/5         Elbow Extension (C7) 5/5 5-/5           Wrist Flexion (C7) /5 /5           Wrist Extension (C6)             Resisted Thumb Extension/Finger Abduction (C8/T1)             Resisted Cervical Rotation (C1):             Resisted Shoulder Shrug (C2, 3, 4):               Strength                                                        Manual:   Joint Directon Grade Treatment Effect   R GHJ (not reassessed at progress report) Posterior Inferior II Increased Symptoms - reported apprehension of dislocation, crepitus noted   Thoracic CPA III Unremarkable for pain reproduction, some hypomobility noted             Special Tests:     None indicated at IE    Neurological Screen:    Radiating symptoms? Yes=glenohumeral region  Functional Mobility:     able to perform ADLs independently. Reports difficulty lifting objects     Body Structures Involved:  1. Bones  2. Joints  3. Muscles  4. Ligaments Body Functions Affected:  1. Sensory/Pain  2. Neuromusculoskeletal  3. Movement Related Activities and Participation Affected:  1. Mobility  2. Self Care   Number of elements that affect the Plan of Care: 3: MODERATE COMPLEXITY   CLINICAL PRESENTATION:   Presentation: Evolving clinical presentation with changing clinical characteristics: MODERATE COMPLEXITY   CLINICAL DECISION MAKING:      Use of outcome tool(s) and clinical judgement create a POC that gives a: Questionable prediction of patient's progress: MODERATE COMPLEXITY     See associated treatment note for treatment provided today.     Future Appointments   Date Time Provider Ana M García   1/11/2022  5:00 PM Meena Leon, [place] [facility]   1/13/2022  5:15 PM Meena Leon, PT [facility]   1/18/2022  5:15 PM Meena Leon PT [facility]   1/20/2022  5:00 PM Meena Leon PT [facility]   1/25/2022  5:15 PM Meena Leon PT [facility]   1/27/2022  5:15 PM Meena Leon PT [facility]   3/8/2022  9:45 AM MD GIOVANI Richey PT

## 2022-01-06 NOTE — PROGRESS NOTES
Romel Valentine  : 1975  Payor: Mercedes Kong / Plan: [facility] / Product Type: O /  [address] [facility] at [address] [place] [address]. Robel Patricia, [address] [place] [address]  [phone]   [phone]                                                          Radha Guerrero MD      OUTPATIENT PHYSICAL THERAPY: Daily Treatment Note 2022 Visit Count:  6    Tx Diagnosis:  Pain in Right Shoulder (M25.511)      Pre-treatment Symptoms/Complaints: See Progress Report dated 22 for details   Pain: Initial:3/10  Medications Last Reviewed:  2022     Post Session: 3/10   Updated Objective Findings: See Progress Report dated 22 for details        TREATMENT:   THERAPEUTIC EXERCISE: (40 minutes):  Exercises per grid below to improve mobility, strength and balance. Required minimal visual, verbal and manual cues to promote proper body alignment and promote proper body posture. Progressed resistance and complexity of movement as indicated.      Date:  21 Date:  21 Date  21 Date  21 Date  21 Date  1/3/22 Date  22   Activity/Exercise Parameters Parameters        Education       Updated measurements, discussed findings from thoracic exam, anatomy/physiology of complaint   Rows 5f43y19 lbs  3t53k39 lbs   2i84e17 lbs    scap retract with ER 3x10, blue band         Bicep curls with scap setting 3x10x8 lbs         Shoulder taps 3x10, EOB         UBE 4 min forward, 4 min backward; 1 mile 4 min forward, 4 min backward; 1 mile 4/4, twin peaks, level 3 4/4, twin peaks, level 4 4/4, twin peaks, level 4 4/4, level 5    Farmer's carries  15 lbs, 3 large laps 20 lbs, 3 large laps 25 lbs, 3 large laps 25 lbs, 3 large laps, also OH carry 2 laps 25 lbs, 3 large laps, also OH carry 2 laps Double KB rack carries, 15 lbs each    punches 3x10, yellow band for ER iso, standing 3x10, yellow band for ER iso, standing        Prone y's With dowel, 3x10 With dowel, 3x10 With dowel, 3x10  3x10, yellow band 2x10 prone with cones    Prone t's Thumbs up, 3x10 Thumbs up, 3x10x2 lbs Thumbs up, 3x10x2 lbs  3x10x3 lbs     Prone I's 3x10 3x10 3x10x2 lbs  3x10x3 lbs     Lat pull downs, supinated  6n37o33 lbs 9v57q82 lbs 0m58i21 lbs  2c21x85 lbs 3y45l89 lbs 9z14s10 lbs   Child's pose lat stretch 3x30 sec    10x10 sec holds, at cable column     Wall walks   2x10, with ER iso 2x10, with ER iso       Rhythmic stabilization  Supine, 3 way, 2x10, blue tubing Standing, 3 way, 3x10, blue tubing       Deadlift   Empty training bar with red band, 3x10   9p31g88 lbs    Rack pulls    1r05z16 lbs 1d62g01 lbs     Ring assisted overhead press    4i16i13 lbs 8m12f26 lbs     Wall slide with lift off      2x10x5 lbs    Scaption raises      2x10x2 lbs, 5 sec holds    Open books       x15   Thread the needle       x15   Shoulder ADD       3x10x7 lbs         THERAPEUTIC ACTIVITY: ( 0 minutes): Activities per gid below to improve functional movement related mobility, strength and balance to improve neuro-muscular carryover to daily functional activities for improving patient's quality of life. Required visual, verbal and manual cues to promote proper body alignment and promote proper body posture/mechanics. Progressed resistance and complexity of movement as indicated. Date:   Date:   Date:     Activity/Exercise Parameters Parameters Parameters                                                                               MANUAL THERAPY: (0 minutes): Joint mobilization, Soft tissue mobilization was utilized and necessary because of the patient's restricted joint motion and restricted motion of soft tissue mobility. Date  1/6/2022    Technique Used Grade  Level # Time(s) Effect while being performed                                                                 HEP Log Date 1.    Rhythmic stab, rows, shoulder taps, bicep curls with scap setting 12/9/21   2.     3. 4.    5.           [employer] Portal  Treatment/Session Summary:    Response to Treatment: See Progress Report dated 1/6/22 for details   Communication/Consultation:  See Progress Report dated 1/6/22 for details   Equipment provided today: See Progress Report dated 1/6/22 for details   Recommendations/Intent for next treatment session:   Next visit will focus on Pain Science Education RTC strengthening periscapular strengthening, shoulder stability. Treatment Plan of Care Effective Dates: 12/13/2021 TO 1/23/2022 (60 days).   Frequency/Duration: 2 times a week for 60 Days             Total Treatment Billable Duration:   40  Min Rx, 5 unbilled  PT Patient Time In/Time Out  Time In: 1800  Time Out: [address], PT    Future Appointments   Date Time Provider Ana M García   1/11/2022  5:00 PM Crystal Sainz, PT [facility]   1/13/2022  5:15 PM [facility]   1/18/2022  5:15 PM [facility]   1/20/2022  5:00 PM Crystal Sainz, PT [facility]   1/25/2022  5:15 PM [facility]z, [facility]   1/27/2022  5:15 PM Crystal Sainz, PT [facility]   3/8/2022  9:45 AM MD GIOVANI Russ

## 2022-01-11 ENCOUNTER — HOSPITAL ENCOUNTER (OUTPATIENT)
Dept: PHYSICAL THERAPY | Age: 47
Discharge: HOME | End: 2022-01-11
Payer: COMMERCIAL

## 2022-01-11 PROCEDURE — 97110 THERAPEUTIC EXERCISES: CPT

## 2022-01-11 NOTE — PROGRESS NOTES
Bee Jose  : 1975  Payor: Albertha Osler / Plan: [facility] / Product Type: PPO /  [address] at [address] [place]. [address] [place] [address], [facility], [address] [place] [address] Road  [phone]   [phone]                                                          Mynor Story MD      OUTPATIENT PHYSICAL THERAPY: Daily Treatment Note 2022 Visit Count:  7    Tx Diagnosis:  Pain in Right Shoulder (M25.511)      Pre-treatment Symptoms/Complaints: Doing okay. Feels like the pain in his back may be coming from his ribs. Pain: Initial:3/10  Medications Last Reviewed:  2022     Post Session: 3/10   Updated Objective Findings: See Progress Report dated 22 for details        TREATMENT:   THERAPEUTIC EXERCISE: (47 minutes):  Exercises per grid below to improve mobility, strength and balance. Required minimal visual, verbal and manual cues to promote proper body alignment and promote proper body posture. Progressed resistance and complexity of movement as indicated.      Date:  21 Date  21 Date  21 Date  21 Date  1/3/22 Date  22 Date  22   Activity/Exercise Parameters         Education      Updated measurements, discussed findings from thoracic exam, anatomy/physiology of complaint    Rows  8h11l22 lbs   5t78k03 lbs     scap retract with ER          Bicep curls with scap setting          Shoulder taps          UBE 4 min forward, 4 min backward; 1 mile 4/4, twin peaks, level 3 4/4, twin peaks, level 4 4/4, twin peaks, level 4 4/4, level 5  4/4, twin peaks, level 3   Farmer's carries  20 lbs, 3 large laps 25 lbs, 3 large laps 25 lbs, 3 large laps, also OH carry 2 laps 25 lbs, 3 large laps, also OH carry 2 laps Double KB rack carries, 15 lbs each  Rack carry (15 lbs), suitcase carry (30 lbs); 2 laps each hand   punches 3x10, yellow band for ER iso, standing         Prone y's With dowel, 3x10 With dowel, 3x10  3x10, yellow band 2x10 prone with cones     Prone t's Thumbs up, 3x10x2 lbs Thumbs up, 3x10x2 lbs  3x10x3 lbs      Prone I's 3x10 3x10x2 lbs  3x10x3 lbs      Lat pull downs, supinated  6b67p35 lbs 4p85b43 lbs  6s66z62 lbs 0v66u44 lbs 1b65c51 lbs    Child's pose lat stretch    10x10 sec holds, at cable column      Wall walks  2x10, with ER iso 2x10, with ER iso        Rhythmic stabilization Supine, 3 way, 2x10, blue tubing Standing, 3 way, 3x10, blue tubing        Deadlift  Empty training bar with red band, 3x10   9f81f52 lbs     Rack pulls   1s14v00 lbs 1h57c69 lbs      Ring assisted overhead press   5f56i01 lbs 8p36u26 lbs      Wall slide with lift off     2x10x5 lbs     Scaption raises     2x10x2 lbs, 5 sec holds  3x10x3 lbs, > shoulder height   Open books      x15 Half kneeling, x15/side   Thread the needle      x15 x15   Shoulder ADD      3x10x7 lbs 3x10x7 lbs   Shoulder IR at 90 deg       3x10x3 lbs   Shoulder ER at 90 deg       3x10x3 lbs   Double KB front squat (box touches)       3x10, 15 lbs each hand         THERAPEUTIC ACTIVITY: ( 0 minutes): Activities per gid below to improve functional movement related mobility, strength and balance to improve neuro-muscular carryover to daily functional activities for improving patient's quality of life. Required visual, verbal and manual cues to promote proper body alignment and promote proper body posture/mechanics. Progressed resistance and complexity of movement as indicated. Date:   Date:   Date:     Activity/Exercise Parameters Parameters Parameters                                                                               MANUAL THERAPY: (0 minutes): Joint mobilization, Soft tissue mobilization was utilized and necessary because of the patient's restricted joint motion and restricted motion of soft tissue mobility.         Date  1/11/2022    Technique Used Grade  Level # Time(s) Effect while being performed HEP Log Date 1. Rhythmic stab, rows, shoulder taps, bicep curls with scap setting 12/9/21   2.     3.    4.    5.           [employer] Portal  Treatment/Session Summary:    Response to Treatment: Assessed rib mobility further today. Revealed no significant hypomobility or reproduction of pain. Continued thoracic mobility, periscapular strengthening and functional strengthening. Introduced RTC strengthening at ~90 deg shoulder ABD to address scapular stabilization with arm away from body. Pt challenged by additions but denied increased pain. Communication/Consultation:     Equipment provided today:    Recommendations/Intent for next treatment session:   Next visit will focus on Pain Science Education RTC strengthening periscapular strengthening, shoulder stability. Treatment Plan of Care Effective Dates: 12/13/2021 TO 1/23/2022 (60 days).   Frequency/Duration: 2 times a week for 60 Days             Total Treatment Billable Duration:   52  Min Rx  PT Patient Time In/Time Out  Time In: 1630  Time Out: [address], PT    Future Appointments   Date Time Provider Ana M García   1/13/2022  5:15 PM Meena Phi, PT [facility]   1/18/2022  5:15 PM Meena Phi, PT [facility]   1/20/2022  5:00 PM Meena Phi, PT [facility]   1/25/2022  5:15 PM Meena Phi, PT [facility]   1/27/2022  5:15 PM [facility]   3/8/2022  9:45 AM MD GIOVANI Richey

## 2022-01-13 ENCOUNTER — HOSPITAL ENCOUNTER (OUTPATIENT)
Dept: PHYSICAL THERAPY | Age: 47
Discharge: HOME | End: 2022-01-13
Payer: COMMERCIAL

## 2022-01-13 PROCEDURE — 97110 THERAPEUTIC EXERCISES: CPT

## 2022-01-13 NOTE — PROGRESS NOTES
Kimberly Fuel  : 1975  Payor: Deborah Colbert / Plan: [facility] [employer] [facility] / Product Type: PPO /  Dorethea Ryne at [address] [facility]. [address] [place] [address], [facility], [address] [place] [address] Road  [phone]   [phone]                                                          Rossy Mora MD      OUTPATIENT PHYSICAL THERAPY: Daily Treatment Note 2022 Visit Count:  8    Tx Diagnosis:  Pain in Right Shoulder (M25.511)      Pre-treatment Symptoms/Complaints: Shoulder felt fine after last session, but his legs are still sore. Pain: Initial:3/10  Medications Last Reviewed:  2022     Post Session: 3/10   Updated Objective Findings: See Progress Report dated 22 for details        TREATMENT:   THERAPEUTIC EXERCISE: (45 minutes):  Exercises per grid below to improve mobility, strength and balance. Required minimal visual, verbal and manual cues to promote proper body alignment and promote proper body posture. Progressed resistance and complexity of movement as indicated.      Date:  21 Date  21 Date  21 Date  21 Date  1/3/22 Date  22 Date  22 Date  22   Activity/Exercise Parameters          Education      Updated measurements, discussed findings from thoracic exam, anatomy/physiology of complaint     Rows  9n60w20 lbs   4q99h88 lbs      scap retract with ER           Bicep curls with scap setting           Shoulder taps           UBE 4 min forward, 4 min backward; 1 mile 4/4, twin peaks, level 3 4/4, twin peaks, level 4 4/4, twin peaks, level 4 4/4, level 5  4/4, twin peaks, level 3 Sprint, 4/4, level 5   Farmer's carries  20 lbs, 3 large laps 25 lbs, 3 large laps 25 lbs, 3 large laps, also OH carry 2 laps 25 lbs, 3 large laps, also OH carry 2 laps Double KB rack carries, 15 lbs each  Rack carry (15 lbs), suitcase carry (30 lbs); 2 laps each hand Rack carry (15 lbs), suitcase carry (30 lbs); 2 laps each hand   punches 3x10, yellow band for ER iso, standing          Prone y's With dowel, 3x10 With dowel, 3x10  3x10, yellow band 2x10 prone with cones   3x10, yellow band   Prone t's Thumbs up, 3x10x2 lbs Thumbs up, 3x10x2 lbs  3x10x3 lbs    2x10x4 lbs   Prone I's 3x10 3x10x2 lbs  3x10x3 lbs       Lat pull downs, supinated  9b06t45 lbs 9u98j46 lbs  9o92n79 lbs 0v49i46 lbs 7u90r17 lbs     Child's pose lat stretch    10x10 sec holds, at cable column       Wall walks  2x10, with ER iso 2x10, with ER iso         Rhythmic stabilization Supine, 3 way, 2x10, blue tubing Standing, 3 way, 3x10, blue tubing         Deadlift  Empty training bar with red band, 3x10   7g40k78 lbs      Rack pulls   8p92x15 lbs 8d48n11 lbs       Ring assisted overhead press   9v14r66 lbs 9z71h71 lbs       Wall slide with lift off     2x10x5 lbs      Scaption raises     2x10x2 lbs, 5 sec holds  3x10x3 lbs, > shoulder height    Open books      x15 Half kneeling, x15/side Half kneeling, x15/side   Thread the needle      x15 x15 X15/side   Shoulder ADD      3x10x7 lbs 3x10x7 lbs 8o88e67 lbs   Shoulder IR at 90 deg       3x10x3 lbs 3x10x3 lbs   Shoulder ER at 90 deg       3x10x3 lbs 2x15x3 lbs   Double KB front squat (box touches)       3x10, 15 lbs each hand    Overhead press        0w04d54 lbs         THERAPEUTIC ACTIVITY: ( 0 minutes): Activities per gid below to improve functional movement related mobility, strength and balance to improve neuro-muscular carryover to daily functional activities for improving patient's quality of life. Required visual, verbal and manual cues to promote proper body alignment and promote proper body posture/mechanics. Progressed resistance and complexity of movement as indicated.      Date:   Date:   Date:     Activity/Exercise Parameters Parameters Parameters                                                                               MANUAL THERAPY: (0 minutes): Joint mobilization, Soft tissue mobilization was utilized and necessary because of the patient's restricted joint motion and restricted motion of soft tissue mobility. Date  1/13/2022    Technique Used Grade  Level # Time(s) Effect while being performed                                                                 HEP Log Date 1. Rhythmic stab, rows, shoulder taps, bicep curls with scap setting 12/9/21   2.     3.    4.    5.           [facility] Portal  Treatment/Session Summary:    Response to Treatment: Progressed to overhead presses today. Pt continues to present with palpable pop to superior shoulder with resisted overhead pressing. However, the pop was not present with very light load, indicating the pop is more muscular in nature. Communication/Consultation:     Equipment provided today:    Recommendations/Intent for next treatment session:   Next visit will focus on Pain Science Education RTC strengthening periscapular strengthening, shoulder stability. Treatment Plan of Care Effective Dates: 12/13/2021 TO 1/23/2022 (60 days).   Frequency/Duration: 2 times a week for 60 Days             Total Treatment Billable Duration:   45  Min Rx, 5 unbilled  PT Patient Time In/Time Out  Time In: 1700  Time Out: [address] PT    Future Appointments   Date Time Provider Ana M García   1/18/2022  5:15 PM Juany Schuster PT [facility] AND [facility]   1/20/2022  5:00 PM Juany Schuster PT [facility]   1/25/2022  5:15 PM Juany Schuster PT [facility]   1/27/2022  5:15 PM Juany Schuster PT [facility]   3/8/2022  9:45 AM MD GIOVANI Rajan

## 2022-01-18 ENCOUNTER — HOSPITAL ENCOUNTER (OUTPATIENT)
Dept: PHYSICAL THERAPY | Age: 47
Discharge: HOME | End: 2022-01-18
Payer: COMMERCIAL

## 2022-01-18 NOTE — PROGRESS NOTES
Kwesi Roles  : 1975  Primary: [facility]*  Secondary:  [facility] [address]  [address] [place] [address], [address] [place] [address]  [phone]   [phone]      PHYSICAL THERAPY    Patient unable to attend appointment today, hurt his back over the weekend.     Adrien Dill, PT

## 2022-01-20 ENCOUNTER — HOSPITAL ENCOUNTER (OUTPATIENT)
Dept: PHYSICAL THERAPY | Age: 47
Discharge: HOME | End: 2022-01-20
Payer: COMMERCIAL

## 2022-01-20 PROCEDURE — 97110 THERAPEUTIC EXERCISES: CPT

## 2022-01-20 NOTE — PROGRESS NOTES
Zach Zarate  : 1975  Payor: Marybel Nolasco / Plan: [place] [facility] / Product Type: PPO /  Mateo Bard at [address] [place]. [address]oval Olguin., [address] [place] [address], [address] [place] [address]  [phone]   [phone]                                                          Kendall Herzog MD      OUTPATIENT PHYSICAL THERAPY: Daily Treatment Note 2022 Visit Count:  9    Tx Diagnosis:  Pain in Right Shoulder (M25.511)      Pre-treatment Symptoms/Complaints: Had to cancel last session due to exacerbation of back pain from shoveling snow over the weekend. Shoulder did okay with the shoveling though. Pain: Initial:3/10  Medications Last Reviewed:  2022     Post Session: 3/10   Updated Objective Findings: See Progress Report dated 22 for details        TREATMENT:   THERAPEUTIC EXERCISE: (44 minutes):  Exercises per grid below to improve mobility, strength and balance. Required minimal visual, verbal and manual cues to promote proper body alignment and promote proper body posture. Progressed resistance and complexity of movement as indicated.      Date  21 Date  21 Date  21 Date  1/3/22 Date  22 Date  22 Date  22 Date  22   Activity/Exercise           Education     Updated measurements, discussed findings from thoracic exam, anatomy/physiology of complaint      Rows 0s42h30 lbs   6l68p45 lbs       scap retract with ER           Bicep curls with scap setting           Shoulder taps           UBE 4/4, twin peaks, level 3 4/4, twin peaks, level 4 4/4, twin peaks, level 4 4/4, level 5  4/4, twin peaks, level 3 Sprint, 4/4, level 5 Progressive 4/4, level 5   Farmer's carries  25 lbs, 3 large laps 25 lbs, 3 large laps, also OH carry 2 laps 25 lbs, 3 large laps, also OH carry 2 laps Double KB rack carries, 15 lbs each  Rack carry (15 lbs), suitcase carry (30 lbs); 2 laps each hand Rack carry (15 lbs), suitcase carry (30 lbs); 2 laps each hand OH carry (15 lbs), suitcase (30 lbs); 2 laps each   punches           Prone y's With dowel, 3x10  3x10, yellow band 2x10 prone with cones   3x10, yellow band 3x10x2 lbs   Prone t's Thumbs up, 3x10x2 lbs  3x10x3 lbs    2x10x4 lbs 3x10x4 lbs   Prone I's 3x10x2 lbs  3x10x3 lbs        Lat pull downs, supinated  4r05q17 lbs  1f15s67 lbs 3b05g16 lbs 4o08k96 lbs      Child's pose lat stretch   10x10 sec holds, at cable column        Wall walks  2x10, with ER iso          Rhythmic stabilization Standing, 3 way, 3x10, blue tubing          Deadlift Empty training bar with red band, 3x10   7t53f17 lbs       Rack pulls  0q97v05 lbs 9k02x20 lbs        Ring assisted overhead press  5q29y90 lbs 7q45s56 lbs        Wall slide with lift off    2x10x5 lbs       Scaption raises    2x10x2 lbs, 5 sec holds  3x10x3 lbs, > shoulder height     Open books     x15 Half kneeling, x15/side Half kneeling, x15/side Half kneeling, x15/side   Thread the needle     x15 x15 X15/side    Shoulder ADD     3x10x7 lbs 3x10x7 lbs 6h05v33 lbs 3v91c05 lbs   Shoulder IR at 90 deg      3x10x3 lbs 3x10x3 lbs 3x10x3 lbs   Shoulder ER at 90 deg      3x10x3 lbs 2x15x3 lbs 3x10x3 lbs   Double KB front squat (box touches)      3x10, 15 lbs each hand     Overhead press       0x99u49 lbs 9u68u20 lbs, first 2 sets with yellow band   Landmine OH press        Half-kneeling, 3x10 bar only         THERAPEUTIC ACTIVITY: ( 0 minutes): Activities per gid below to improve functional movement related mobility, strength and balance to improve neuro-muscular carryover to daily functional activities for improving patient's quality of life. Required visual, verbal and manual cues to promote proper body alignment and promote proper body posture/mechanics. Progressed resistance and complexity of movement as indicated.      Date:   Date:   Date:     Activity/Exercise Parameters Parameters Parameters [address] [facility]                   MANUAL THERAPY: (0 minutes): Joint mobilization, Soft tissue mobilization was utilized and necessary because of the patient's restricted joint motion and restricted motion of soft tissue mobility. Date  1/20/2022    Technique Used Grade  Level # Time(s) Effect while being performed                                                                 HEP Log Date 1. Rhythmic stab, rows, shoulder taps, bicep curls with scap setting 12/9/21   2.     3.    4.    5.           [facility] Portal  Treatment/Session Summary:    Response to Treatment: Introduced landmine overhead press today to further challenge overhead shoulder stability. Pt with less discomfort and popping sensation with this version. However, did not reach full shoulder flexion with the exercise. Pt performed overhead pressing today with resistance band pulling anteriorly to encourage posterior periscapular activation. Pt with decreased popping with uncued set following sets with banded cueing. Communication/Consultation:     Equipment provided today:    Recommendations/Intent for next treatment session:   Next visit will focus on Pain Science Education RTC strengthening periscapular strengthening, shoulder stability. Treatment Plan of Care Effective Dates: 12/13/2021 TO 1/23/2022 (60 days).   Frequency/Duration: 2 times a week for 60 Days             Total Treatment Billable Duration:   40  Min Rx  PT Patient Time In/Time Out  Time In: 0335  Time Out: [facility], PT    Future Appointments   Date Time Provider Ana M García   1/25/2022  5:15 PM Crystal Sainz, PT [facility] AND [facility]   1/27/2022  5:15 PM Crystal Sainz PT [facility]   3/8/2022  9:45 AM MD GIOVANI Russ

## 2022-01-25 ENCOUNTER — HOSPITAL ENCOUNTER (OUTPATIENT)
Dept: PHYSICAL THERAPY | Age: 47
Discharge: HOME | End: 2022-01-25
Payer: COMMERCIAL

## 2022-01-25 PROCEDURE — 97110 THERAPEUTIC EXERCISES: CPT

## 2022-01-25 NOTE — PROGRESS NOTES
Juliette Kawasaki  : 1975  Payor: Senthil Burris / Plan: [place] [facility] / Product Type: PPO /  [address] [facility] at [address] [place] [address] [address] [place] [address], [facility], [address] [place] [address]  [phone]   [phone]                                                          Jamal Davis MD      OUTPATIENT PHYSICAL THERAPY: Daily Treatment Note 2022 Visit Count:  10    Tx Diagnosis:  Pain in Right Shoulder (M25.511)      Pre-treatment Symptoms/Complaints: Hasn't had any instances of the shoulder \"giving out\" but he feels like it might frequently. Feels like the shoulder is getting stronger though. Pain: Initial:3/10  Medications Last Reviewed:  2022     Post Session: 3/10   Updated Objective Findings: See Progress Report dated 22 for details        TREATMENT:   THERAPEUTIC EXERCISE: (45 minutes):  Exercises per grid below to improve mobility, strength and balance. Required minimal visual, verbal and manual cues to promote proper body alignment and promote proper body posture. Progressed resistance and complexity of movement as indicated.      Date  21 Date  21 Date  1/3/22 Date  22 Date  22 Date  22 Date  22 Date  22   Activity/Exercise           Education    Updated measurements, discussed findings from thoracic exam, anatomy/physiology of complaint       Rows   2z22m37 lbs        scap retract with ER           Bicep curls with scap setting           Shoulder taps           UBE 4/4, twin peaks, level 4 4/4, twin peaks, level 4 4/4, level 5  4/4, twin peaks, level 3 Sprint, 4/4, level 5 Progressive 4/4, level 5 4/4, level 6   Farmer's carries  25 lbs, 3 large laps, also OH carry 2 laps 25 lbs, 3 large laps, also OH carry 2 laps Double KB rack carries, 15 lbs each  Rack carry (15 lbs), suitcase carry (30 lbs); 2 laps each hand Rack carry (15 lbs), suitcase carry (30 lbs); 2 laps each hand OH carry (15 lbs), suitcase (30 lbs); 2 laps each OH carry (15 lbs), suitcase (30 lbs); 3 laps each   punches           Prone y's  3x10, yellow band 2x10 prone with cones   3x10, yellow band 3x10x2 lbs    Prone t's  3x10x3 lbs    2x10x4 lbs 3x10x4 lbs    Prone I's  3x10x3 lbs         Lat pull downs, supinated   1y31v07 lbs 2v45r85 lbs 8f69q75 lbs       Child's pose lat stretch  10x10 sec holds, at cable column         Wall walks            Rhythmic stabilization           Deadlift   5e19y36 lbs        Rack pulls 6r65p75 lbs 0s70n78 lbs         Ring assisted overhead press 1d13r47 lbs 5a40t27 lbs         Wall slide with lift off   2x10x5 lbs        Scaption raises   2x10x2 lbs, 5 sec holds  3x10x3 lbs, > shoulder height      Open books    x15 Half kneeling, x15/side Half kneeling, x15/side Half kneeling, x15/side Half kneeling, x15/side   Thread the needle    x15 x15 X15/side     Shoulder ADD    3x10x7 lbs 3x10x7 lbs 4u69f48 lbs 9h82i66 lbs    Shoulder IR at 90 deg     3x10x3 lbs 3x10x3 lbs 3x10x3 lbs    Shoulder ER at 90 deg     3x10x3 lbs 2x15x3 lbs 3x10x3 lbs    Double KB front squat (box touches)     3x10, 15 lbs each hand   3x10, 15 lbs each hand   Overhead press      1z83y55 lbs 4y18i21 lbs, first 2 sets with yellow band 3x5x40 lbs   Landmine OH press       Half-kneeling, 3x10 bar only Staggered stance, 3x6x49 lbs   Bent over rows        3a14b22 lbs         THERAPEUTIC ACTIVITY: ( 0 minutes): Activities per gid below to improve functional movement related mobility, strength and balance to improve neuro-muscular carryover to daily functional activities for improving patient's quality of life. Required visual, verbal and manual cues to promote proper body alignment and promote proper body posture/mechanics. Progressed resistance and complexity of movement as indicated.      Date:   Date:   Date:     Activity/Exercise Parameters Parameters Parameters     [address]                                                        [address] MANUAL THERAPY: (0 minutes): Joint mobilization, Soft tissue mobilization was utilized and necessary because of the patient's restricted joint motion and restricted motion of soft tissue mobility. Date  1/25/2022    Technique Used Grade  Level # Time(s) Effect while being performed                                                                 HEP Log Date 1. Rhythmic stab, rows, shoulder taps, bicep curls with scap setting 12/9/21   2.     3.    4.    5.           [employer] Portal  Treatment/Session Summary:    Response to Treatment: Able to progress to strength rep-ranges today due to low pain recently. Pt continues to report pain between two ribs with overhead lifting. Pain is likely from lat, as it worsens with narrow  for overhead pressing. Overall, pt is making appropriate strength gains. Communication/Consultation:     Equipment provided today:    Recommendations/Intent for next treatment session:   Next visit will focus on Pain Science Education RTC strengthening periscapular strengthening, shoulder stability. Hanging lat stretch       Treatment Plan of Care Effective Dates: 12/13/2021 TO 1/23/2022 (60 days).   Frequency/Duration: 2 times a week for 60 Days             Total Treatment Billable Duration:   39  Min Rx  PT Patient Time In/Time Out  Time In: 1708  Time Out: 1077 Tara Joe PT    Future Appointments   Date Time Provider Ana M García   1/27/2022  5:15 PM Jaylen Bains PT [facility]   3/8/2022  9:45 AM MD GIOVANI Marcus

## 2022-01-27 ENCOUNTER — HOSPITAL ENCOUNTER (OUTPATIENT)
Dept: PHYSICAL THERAPY | Age: 47
Discharge: HOME | End: 2022-01-27
Payer: COMMERCIAL

## 2022-01-27 PROCEDURE — 97110 THERAPEUTIC EXERCISES: CPT

## 2022-01-27 NOTE — PROGRESS NOTES
Ibis Ju  : 1975  Payor: Sirisha Cornell / Plan: [facility] / Product Type: PPO /  [address] at [address]. [address] [place] [address], [facility], [address] [place] [address] Road  [phone]   [phone]                                                          Delilah Hodgkin, MD      OUTPATIENT PHYSICAL THERAPY: Daily Treatment Note 2022 Visit Count:  11    Tx Diagnosis:  Pain in Right Shoulder (M25.511)      Pre-treatment Symptoms/Complaints: Legs were sore after last session. Pain: Initial:3/10  Medications Last Reviewed:  2022     Post Session: 3/10   Updated Objective Findings: See Progress Report dated 22 for details        TREATMENT:   THERAPEUTIC EXERCISE: (50 minutes):  Exercises per grid below to improve mobility, strength and balance. Required minimal visual, verbal and manual cues to promote proper body alignment and promote proper body posture. Progressed resistance and complexity of movement as indicated.      Date  21 Date  1/3/22 Date  22 Date  22 Date  22 Date  22 Date  22 Date  22   Activity/Exercise           Education   Updated measurements, discussed findings from thoracic exam, anatomy/physiology of complaint        Rows  9u10x11 lbs         scap retract with ER           Bicep curls with scap setting           Shoulder taps           UBE 4/4, twin peaks, level 4 4/4, level 5  4/4, twin peaks, level 3 Sprint, 4/4, level 5 Progressive 4/4, level 5 4/4, level 6 Multi-peak, 4/4, level 5   Farmer's carries  25 lbs, 3 large laps, also OH carry 2 laps Double KB rack carries, 15 lbs each  Rack carry (15 lbs), suitcase carry (30 lbs); 2 laps each hand Rack carry (15 lbs), suitcase carry (30 lbs); 2 laps each hand OH carry (15 lbs), suitcase (30 lbs); 2 laps each OH carry (15 lbs), suitcase (30 lbs); 3 laps each    punches           Prone y's 3x10, yellow band 2x10 prone with cones   3x10, yellow band 3x10x2 lbs     Prone t's 3x10x3 lbs    2x10x4 lbs 3x10x4 lbs     Prone I's 3x10x3 lbs          Lat pull downs, supinated  9c06g89 lbs 3x10o72 lbs 9f73w16 lbs        Child's pose lat stretch 10x10 sec holds, at cable column          Wall walks            Rhythmic stabilization           Deadlift  0i88n13 lbs         Rack pulls 1c09e87 lbs          Ring assisted overhead press 1o88m42 lbs          Wall slide with lift off  2x10x5 lbs         Scaption raises  2x10x2 lbs, 5 sec holds  3x10x3 lbs, > shoulder height       Open books   x15 Half kneeling, x15/side Half kneeling, x15/side Half kneeling, x15/side Half kneeling, x15/side    Thread the needle   x15 x15 X15/side      Shoulder ADD   3x10x7 lbs 3x10x7 lbs 4k67b95 lbs 3c15y10 lbs     Shoulder IR at 90 deg    3x10x3 lbs 3x10x3 lbs 3x10x3 lbs  4x10x3 lbs   Shoulder ER at 90 deg    3x10x3 lbs 2x15x3 lbs 3x10x3 lbs  4x10x3 lbs   Double KB front squat (box touches)    3x10, 15 lbs each hand   3x10, 15 lbs each hand    Overhead press     1f92s85 lbs 0x60a08 lbs, first 2 sets with yellow band 3x5x40 lbs    Landmine OH press      Half-kneeling, 3x10 bar only Staggered stance, 3x6x49 lbs Staggered stance, 3x6x49 lbs   Bent over rows       8v28t78 lbs 3c81v70 lbs   Squat to [facility] press        4g09r03 lbs   Push up        3x10, edge of shuttle   Standing punches        X30x10 lbs         THERAPEUTIC ACTIVITY: ( 0 minutes): Activities per gid below to improve functional movement related mobility, strength and balance to improve neuro-muscular carryover to daily functional activities for improving patient's quality of life. Required visual, verbal and manual cues to promote proper body alignment and promote proper body posture/mechanics. Progressed resistance and complexity of movement as indicated.      Date:   Date:   Date:     Activity/Exercise Parameters Parameters Parameters     [address]                                                        [address] MANUAL THERAPY: (0 minutes): Joint mobilization, Soft tissue mobilization was utilized and necessary because of the patient's restricted joint motion and restricted motion of soft tissue mobility. Date  1/27/2022    Technique Used Grade  Level # Time(s) Effect while being performed                                                                 HEP Log Date 1. Rhythmic stab, rows, shoulder taps, bicep curls with scap setting 12/9/21   2.     3.    4.    5.           [facility] Portal  Treatment/Session Summary:    Response to Treatment:  Added horizontal pressing today with full range shoulder extension. Pt experience some soft tissue popping with the pressing that decreased with a manual pressure, flossing technique, indicating soft tissue tension as origin of popping sensation. May benefit from flossing technique starting next session. Communication/Consultation:     Equipment provided today:    Recommendations/Intent for next treatment session:   Next visit will focus on Pain Science Education RTC strengthening periscapular strengthening, shoulder stability. Treatment Plan of Care Effective Dates: 12/13/2021 TO 1/23/2022 (60 days).   Frequency/Duration: 2 times a week for 60 Days             Total Treatment Billable Duration:   45  Min Rx  PT Patient Time In/Time Out  Time In: 1700  Time Out: Benito Ruiz PT    Future Appointments   Date Time Provider Ana M García   3/8/2022  9:45 AM MD GIOVANI Sanders

## 2022-01-31 ENCOUNTER — HOSPITAL ENCOUNTER (OUTPATIENT)
Dept: PHYSICAL THERAPY | Age: 47
Discharge: HOME | End: 2022-01-31
Payer: COMMERCIAL

## 2022-01-31 DIAGNOSIS — M54.9 BACK PAIN, UNSPECIFIED BACK LOCATION, UNSPECIFIED BACK PAIN LATERALITY, UNSPECIFIED CHRONICITY: ICD-10-CM

## 2022-01-31 PROCEDURE — 97110 THERAPEUTIC EXERCISES: CPT

## 2022-01-31 NOTE — PROGRESS NOTES
Jose Alberto Weaver  : 1975  Payor: Pancho De Luna / Plan: [facility] / Product Type: PPO /  [address]. Lisy [address]adares[address] [place] [address], [address] [place] [address]  [phone]   [phone]                                                          Kojo Capellan MD      OUTPATIENT PHYSICAL THERAPY: Daily Treatment Note 2022 Visit Count:  12    Tx Diagnosis:  Pain in Right Shoulder (M25.511)      Pre-treatment Symptoms/Complaints: Felt sore but not too bad after last session. Pain: Initial:3/10  Medications Last Reviewed:  2022     Post Session: 3/10   Updated Objective Findings: See Progress Report dated 22 for details        TREATMENT:   THERAPEUTIC EXERCISE: (45 minutes):  Exercises per grid below to improve mobility, strength and balance. Required minimal visual, verbal and manual cues to promote proper body alignment and promote proper body posture. Progressed resistance and complexity of movement as indicated.      Date  1/3/22 Date  22 Date  22 Date  22 Date  22 Date  22 Date  22 Date  22   Activity/Exercise           Education  Updated measurements, discussed findings from thoracic exam, anatomy/physiology of complaint         Rows 1x50e96 lbs          scap retract with ER           Bicep curls with scap setting           Shoulder taps           UBE 4/4, level 5  4/4, twin peaks, level 3 Sprint, 4/4, level 5 Progressive 4/4, level 5 4/4, level 6 Multi-peak, 4/4, level 5 Multi-peak, 4/4, level 5   Farmer's carries  Double KB rack carries, 15 lbs each  Rack carry (15 lbs), suitcase carry (30 lbs); 2 laps each hand Rack carry (15 lbs), suitcase carry (30 lbs); 2 laps each hand OH carry (15 lbs), suitcase (30 lbs); 2 laps each OH carry (15 lbs), suitcase (30 lbs); 3 laps each     punches           Prone y's 2x10 prone with cones   3x10, yellow band 3x10x2 lbs      Prone t's    2x10x4 lbs 3x10x4 lbs Prone I's           Lat pull downs, supinated  5f92q39 lbs 9m38i91 lbs         Child's pose lat stretch           Wall walks            Rhythmic stabilization           Deadlift 2p11d42 lbs          Rack pulls           Ring assisted overhead press           Wall slide with lift off 2x10x5 lbs          Scaption raises 2x10x2 lbs, 5 sec holds  3x10x3 lbs, > shoulder height        Open books  x15 Half kneeling, x15/side Half kneeling, x15/side Half kneeling, x15/side Half kneeling, x15/side     Thread the needle  x15 x15 X15/side       Shoulder ADD  3x10x7 lbs 3x10x7 lbs 5j55v59 lbs 4y63m64 lbs      Shoulder IR at 90 deg   3x10x3 lbs 3x10x3 lbs 3x10x3 lbs  4x10x3 lbs    Shoulder ER at 90 deg   3x10x3 lbs 2x15x3 lbs 3x10x3 lbs  4x10x3 lbs    Double KB front squat (box touches)   3x10, 15 lbs each hand   3x10, 15 lbs each hand     Overhead press    1q86g83 lbs 8n48h97 lbs, first 2 sets with yellow band 3x5x40 lbs     Landmine OH press     Half-kneeling, 3x10 bar only Staggered stance, 3x6x49 lbs Staggered stance, 3x6x49 lbs Staggered stance, 3x8x49   Bent over rows      9j83v16 lbs 5g54a96 lbs 4d81k56 lbs dumbbells    Squat to [facility] press       2s92a15 lbs    Push up       3x10, edge of shuttle    Lateral resisted walking        10/side, 30 lbs   Standing punches       X30x10 lbs 9p51b19 lbs   TRX rows        2x15   TRX push ups        2x15   Pec stretch        5x10 sec holds         THERAPEUTIC ACTIVITY: ( 0 minutes): Activities per gid below to improve functional movement related mobility, strength and balance to improve neuro-muscular carryover to daily functional activities for improving patient's quality of life. Required visual, verbal and manual cues to promote proper body alignment and promote proper body posture/mechanics. Progressed resistance and complexity of movement as indicated.      Date:   Date:   Date:     Activity/Exercise Parameters Parameters Parameters                           [place]                                                 MANUAL THERAPY: (0 minutes): Joint mobilization, Soft tissue mobilization was utilized and necessary because of the patient's restricted joint motion and restricted motion of soft tissue mobility. Date  1/31/2022    Technique Used Grade  Level # Time(s) Effect while being performed                                                                 HEP Log Date 1. Rhythmic stab, rows, shoulder taps, bicep curls with scap setting 12/9/21   2.     3.    4.    5.           [facility] Portal  Treatment/Session Summary:    Response to Treatment: Continued with horizontal pressing with full pain-free shoulder extension due to likely myofascial origin of \"popping\" sensation to anterior shoulder. Pt reported good stretch but no increase in pain. Test re-tested pec stretching for effect on popping and pain to anterior shoulder during overhead press - significant reported improvement following stretch, further implicating anterior musculature as pain-generating structures. Communication/Consultation:  Performance of pec stretch and full-range push ups at [employer] provided today:    Recommendations/Intent for next treatment session:   Next visit will focus on Pain Science Education RTC strengthening periscapular strengthening, shoulder stability. Treatment Plan of Care Effective Dates: 12/13/2021 TO 1/23/2022 (60 days).   Frequency/Duration: 2 times a week for 60 Days             Total Treatment Billable Duration:   39  Min Rx  PT Patient Time In/Time Out  Time In: 5339  Time Out: Ashwini Anders 44, PT    Future Appointments   Date Time Provider Ana M García   2/3/2022  5:00 PM Ty Angles, PT [facility]   2/8/2022  5:00 PM Ty Angles, PT [facility]   2/10/2022  5:00 PM Ty Angles, PT [facility]   3/8/2022  9:45 AM MD GIOVANI Humphries

## 2022-02-03 ENCOUNTER — HOSPITAL ENCOUNTER (OUTPATIENT)
Dept: PHYSICAL THERAPY | Age: 47
Discharge: HOME | End: 2022-02-03
Payer: COMMERCIAL

## 2022-02-03 PROCEDURE — 97110 THERAPEUTIC EXERCISES: CPT

## 2022-02-03 NOTE — PROGRESS NOTES
Mirlande Angulo  : 1975  Payor: Chaparrita Mahoney / Plan: [facility] / Product Type: PPO /  [address] at [address]. Reji IbarraRonan, [address], [address]  [phone]   [phone]                                                          Sandoval Light MD      OUTPATIENT PHYSICAL THERAPY: Daily Treatment Note 2/3/2022 Visit Count:  13    Tx Diagnosis:  Pain in Right Shoulder (M25.511)      Pre-treatment Symptoms/Complaints: Felt okay after last session   Pain: Initial:3/10  Medications Last Reviewed:  2/3/2022     Post Session: 3/10   Updated Objective Findings: See Progress Report dated 22 for details        TREATMENT:   THERAPEUTIC EXERCISE: (46 minutes):  Exercises per grid below to improve mobility, strength and balance. Required minimal visual, verbal and manual cues to promote proper body alignment and promote proper body posture. Progressed resistance and complexity of movement as indicated.      Date  22 Date  22 Date  22 Date  22 Date  22 Date  22 Date  22 Date  2/3/22   Activity/Exercise           Education Updated measurements, discussed findings from thoracic exam, anatomy/physiology of complaint          Rows           scap retract with ER           Bicep curls with scap setting           Shoulder taps           UBE  4/4, twin peaks, level 3 Sprint, 4/4, level 5 Progressive 4/4, level 5 4/4, level 6 Multi-peak, 4/4, level 5 Multi-peak, 4/4, level 5 Multi-peak, 4/4, level 5   Farmer's carries   Rack carry (15 lbs), suitcase carry (30 lbs); 2 laps each hand Rack carry (15 lbs), suitcase carry (30 lbs); 2 laps each hand OH carry (15 lbs), suitcase (30 lbs); 2 laps each OH carry (15 lbs), suitcase (30 lbs); 3 laps each      punches           Prone y's   3x10, yellow band 3x10x2 lbs       Prone t's   2x10x4 lbs 3x10x4 lbs       Prone I's           Lat pull downs, supinated  8d07z55 lbs Child's pose lat stretch           Wall walks            Rhythmic stabilization           Deadlift           Rack pulls           Ring assisted overhead press           Wall slide with lift off           Scaption raises  3x10x3 lbs, > shoulder height         Open books x15 Half kneeling, x15/side Half kneeling, x15/side Half kneeling, x15/side Half kneeling, x15/side      Thread the needle x15 x15 X15/side        Shoulder ADD 3x10x7 lbs 3x10x7 lbs 0u73h56 lbs 8q67f47 lbs       Shoulder IR at 90 deg  3x10x3 lbs 3x10x3 lbs 3x10x3 lbs  4x10x3 lbs     Shoulder ER at 90 deg  3x10x3 lbs 2x15x3 lbs 3x10x3 lbs  4x10x3 lbs     Double KB front squat (box touches)  3x10, 15 lbs each hand   3x10, 15 lbs each hand      Overhead press   8l47c09 lbs 4g10a18 lbs, first 2 sets with yellow band 3x5x40 lbs      Landmine OH press    Half-kneeling, 3x10 bar only Staggered stance, 3x6x49 lbs Staggered stance, 3x6x49 lbs Staggered stance, 3x8x49    Bent over rows     7s96t77 lbs 7c01v12 lbs 4f67k37 lbs dumbbells  0w26m33 lbs dumbbells    Squat to [place] press      2q75h45 lbs  3x10x8 lbs   Push up      3x10, edge of shuttle     Lateral resisted walking       10/side, 30 lbs 10/side, 30 lbs   Standing punches      X30x10 lbs 0o70c76 lbs 3u70u60 lbs   TRX rows       2x15 3x10   TRX push ups       2x15 3x10   Pec stretch       5x10 sec holds 5x10 sec holds   [employer] OH stretch        3 min         THERAPEUTIC ACTIVITY: ( 0 minutes): Activities per gid below to improve functional movement related mobility, strength and balance to improve neuro-muscular carryover to daily functional activities for improving patient's quality of life. Required visual, verbal and manual cues to promote proper body alignment and promote proper body posture/mechanics. Progressed resistance and complexity of movement as indicated.      Date:   Date:   Date:     Activity/Exercise Parameters Parameters Parameters                                   Demarcus Ashley                                       MANUAL THERAPY: (0 minutes): Joint mobilization, Soft tissue mobilization was utilized and necessary because of the patient's restricted joint motion and restricted motion of soft tissue mobility. Date  2/3/2022    Technique Used Grade  Level # Time(s) Effect while being performed                                                                 HEP Log Date 1. Rhythmic stab, rows, shoulder taps, bicep curls with scap setting 12/9/21   2.     3.    4.    5.           [employer] Portal  Treatment/Session Summary:    Response to Treatment: Continued horizontal pressing for anterior shoulder mobility and general strength. Pt with improved ability to press overhead today with less uncomfortable popping to shoulder. Communication/Consultation:  Performance of pec stretch and full-range push ups at [employer] provided today:    Recommendations/Intent for next treatment session:   Next visit will focus on Pain Science Education RTC strengthening periscapular strengthening, shoulder stability. Treatment Plan of Care Effective Dates: 12/13/2021 TO 1/23/2022 (60 days).   Frequency/Duration: 2 times a week for 60 Days             Total Treatment Billable Duration:   55  Min Rx  PT Patient Time In/Time Out  Time In: 9669  Time Out: [facility] 109, PT    Future Appointments   Date Time Provider Ana M García   2/8/2022  5:00 PM [facility]   2/10/2022  5:00 PM [facility]   3/8/2022  9:45 AM MD GIOVANI Carrillo

## 2022-02-08 ENCOUNTER — HOSPITAL ENCOUNTER (OUTPATIENT)
Dept: PHYSICAL THERAPY | Age: 47
Discharge: HOME | End: 2022-02-08
Payer: COMMERCIAL

## 2022-02-08 PROCEDURE — 97110 THERAPEUTIC EXERCISES: CPT

## 2022-02-08 NOTE — PROGRESS NOTES
Kimberly Fuel  : 1975  Payor: Deborah Colbert / Plan: [facility] / Product Type: PPO /  [address] at [address] [place]. [address] [place] [address], [facility], [address] [place] [address] Road  [phone]   [phone]                                                          Rossy Mora MD      OUTPATIENT PHYSICAL THERAPY: Daily Treatment Note 2022 Visit Count:  14    Tx Diagnosis:  Pain in Right Shoulder (M25.511)      Pre-treatment Symptoms/Complaints: The pec stretch feels helpful at work. Pain: Initial:3/10  Medications Last Reviewed:  2022     Post Session: 3/10   Updated Objective Findings: See Progress Report dated 22 for details        TREATMENT:   THERAPEUTIC EXERCISE: (48 minutes):  Exercises per grid below to improve mobility, strength and balance. Required minimal visual, verbal and manual cues to promote proper body alignment and promote proper body posture. Progressed resistance and complexity of movement as indicated.      Date  22 Date  22 Date  22 Date  22 Date  22 Date  22 Date  2/3/22 Date  22   Activity/Exercise           Education           Rows           scap retract with ER           Bicep curls with scap setting           Shoulder taps           UBE 4/4, twin peaks, level 3 Sprint, 4/4, level 5 Progressive 4/4, level 5 4/4, level 6 Multi-peak, 4/4, level 5 Multi-peak, 4/4, level 5 Multi-peak, 4/4, level 5 Sprint, level 6, 4/4 min   Farmer's carries  Rack carry (15 lbs), suitcase carry (30 lbs); 2 laps each hand Rack carry (15 lbs), suitcase carry (30 lbs); 2 laps each hand OH carry (15 lbs), suitcase (30 lbs); 2 laps each OH carry (15 lbs), suitcase (30 lbs); 3 laps each       punches           Prone y's  3x10, yellow band 3x10x2 lbs     1x10x3 lbs   Prone t's  2x10x4 lbs 3x10x4 lbs     2x10x3 lbs   Prone I's           Lat pull downs, supinated            Child's pose lat stretch           Wall walks Rhythmic stabilization           Deadlift           Rack pulls           Ring assisted overhead press           Wall slide with lift off           Scaption raises 3x10x3 lbs, > shoulder height          Open books Half kneeling, x15/side Half kneeling, x15/side Half kneeling, x15/side Half kneeling, x15/side       Thread the needle x15 X15/side         Shoulder ADD 3x10x7 lbs 0p69r86 lbs 8z54h24 lbs        Shoulder IR at 90 deg 3x10x3 lbs 3x10x3 lbs 3x10x3 lbs  4x10x3 lbs      Shoulder ER at 90 deg 3x10x3 lbs 2x15x3 lbs 3x10x3 lbs  4x10x3 lbs      Double KB front squat (box touches) 3x10, 15 lbs each hand   3x10, 15 lbs each hand       Overhead press  0v55k60 lbs 3q16w79 lbs, first 2 sets with yellow band 3x5x40 lbs       Landmine OH press   Half-kneeling, 3x10 bar only Staggered stance, 3x6x49 lbs Staggered stance, 3x6x49 lbs Staggered stance, 3x8x49     Bent over rows    7e95r91 lbs 3c51f93 lbs 2v59k60 lbs dumbbells  4r25c36 lbs dumbbells  3u12y27 lbs dumbbells    Squat to [facility] press     0z09h32 lbs  3x10x8 lbs 3x8x10 lbs   Push up     3x10, edge of shuttle      Lateral resisted walking      10/side, 30 lbs 10/side, 30 lbs 10/side, 30 lbs   Standing punches     X30x10 lbs 5u11g32 lbs 4l91p05 lbs    TRX rows      2x15 3x10 3x10   TRX push ups      2x15 3x10 3x15   Pec stretch      5x10 sec holds 5x10 sec holds 10x10 sec holds   Dowel OH stretch       3 min x20   90-90 shoulder carry        3 large laps, 3 lbs   TRX pec stretch        x15   DB bench press        1y91d60 lbs         THERAPEUTIC ACTIVITY: ( 0 minutes): Activities per gid below to improve functional movement related mobility, strength and balance to improve neuro-muscular carryover to daily functional activities for improving patient's quality of life. Required visual, verbal and manual cues to promote proper body alignment and promote proper body posture/mechanics. Progressed resistance and complexity of movement as indicated.      Date:   Date: Date:     Activity/Exercise Parameters Parameters Parameters                                                                               MANUAL THERAPY: (0 minutes): Joint mobilization, Soft tissue mobilization was utilized and necessary because of the patient's restricted joint motion and restricted motion of soft tissue mobility. Date  2/8/2022    Technique Used Grade  Level # Time(s) Effect while being performed                                                                 HEP Log Date 1. Rhythmic stab, rows, shoulder taps, bicep curls with scap setting 12/9/21   2.     3.    4.    5.           [employer]  Treatment/Session Summary:    Response to Treatment: Continued pec stretching, OH and horizontal pressing, and pulling. Pt able to tolerate increasing volume of work but did report some increased pain in R shoulder with prone Y's at the end of the session, so exercise was held and was modified to prone T's. Communication/Consultation:  Performance of pec stretch and full-range push ups at [employer] provided today:    Recommendations/Intent for next treatment session:   Next visit will focus on Pain Science Education RTC strengthening periscapular strengthening, shoulder stability. Treatment Plan of Care Effective Dates: 12/13/2021 TO 1/23/2022 (60 days).   Frequency/Duration: 2 times a week for 60 Days             Total Treatment Billable Duration:   50  Min Rx  PT Patient Time In/Time Out  Time In: 9249  Time Out: 22[facility], PT    Future Appointments   Date Time Provider Ana M García   2/10/2022  5:00 PM Pricilla Lundberg, [facility] [facility]   3/8/2022  9:45 AM MD GIOVANI Briscoe

## 2022-02-15 ENCOUNTER — HOSPITAL ENCOUNTER (OUTPATIENT)
Dept: PHYSICAL THERAPY | Age: 47
Discharge: HOME | End: 2022-02-15
Payer: COMMERCIAL

## 2022-02-15 NOTE — PROGRESS NOTES
Nargis Click  : 1975  Payor: Dorothy Form / Plan: [facility] / Product Type: PPO /  [address] [place]. [address] [place] [address], [facility], [address] [place] [address]  [phone]   [phone]        OUTPATIENT DAILY NOTE    NAME: Nargis Click    DATE: 2/15/2022    Patient Cancelled physical therapy appointment today  due to \"pulled muscles\". Will follow up next appointment.     Spencer Gusman, PT    Future Appointments   Date Time Provider Ana M García   2/15/2022  7:00 PM Cherelle Banks, [facility] [place] [facility]   2022  5:15 PM Cherelle Banks PT [facility] AND [facility]   3/2/2022  5:15 PM Cherelle Banks PT [facility] AND [facility]   3/8/2022  9:45 AM Margarette Lynch MD [facility] PO[facility]   3/9/2022  5:15 PM Cherelle Banks PT [facility] AND [facility]

## 2022-02-23 ENCOUNTER — APPOINTMENT (OUTPATIENT)
Dept: PHYSICAL THERAPY | Age: 47
End: 2022-02-23
Payer: COMMERCIAL

## 2022-03-02 ENCOUNTER — APPOINTMENT (OUTPATIENT)
Dept: PHYSICAL THERAPY | Age: 47
End: 2022-03-02

## 2022-03-18 PROBLEM — K21.9 GERD (GASTROESOPHAGEAL REFLUX DISEASE): Status: ACTIVE | Noted: 2017-03-01

## 2022-03-18 PROBLEM — M50.30 DDD (DEGENERATIVE DISC DISEASE), CERVICAL: Status: ACTIVE | Noted: 2021-10-05

## 2022-03-18 PROBLEM — F32.9 MDD (MAJOR DEPRESSIVE DISORDER): Status: ACTIVE | Noted: 2017-01-24

## 2022-03-19 PROBLEM — E11.9 DM2 (DIABETES MELLITUS, TYPE 2) (HCC): Status: ACTIVE | Noted: 2017-08-28

## 2022-03-19 PROBLEM — M51.37 DDD (DEGENERATIVE DISC DISEASE), LUMBOSACRAL: Status: ACTIVE | Noted: 2021-10-05

## 2022-03-19 PROBLEM — M77.12 LEFT LATERAL EPICONDYLITIS: Status: ACTIVE | Noted: 2021-08-24

## 2022-03-19 PROBLEM — M51.34 DDD (DEGENERATIVE DISC DISEASE), THORACIC: Status: ACTIVE | Noted: 2021-10-05

## 2022-05-23 ENCOUNTER — TELEPHONE (OUTPATIENT)
Dept: ORTHOPEDIC SURGERY | Age: 47
End: 2022-05-23

## 2022-05-23 NOTE — TELEPHONE ENCOUNTER
Call Kendall Giraldo [facility]   Ph [phone]     She has  Called  Wondering  Why  This  Wk comp  Pt  Is  Scheduled  For  His  MRI at the hospital  as opposed to in our  Office? She  Says it is out of network.  She wonders  Was this pt  preference or  Dr Zoila Forte  Please   Call her

## 2022-05-23 NOTE — TELEPHONE ENCOUNTER
Spoke to the work comp  and explained I am unable to see the referral in the patient's chart right now.  She will call the hospital and get the MRI changed to a different location

## 2022-06-15 DIAGNOSIS — M77.12 LEFT LATERAL EPICONDYLITIS: ICD-10-CM

## 2022-06-28 ENCOUNTER — OFFICE VISIT (OUTPATIENT)
Dept: ORTHOPEDIC SURGERY | Age: 47
End: 2022-06-28

## 2022-06-28 DIAGNOSIS — M77.12 LEFT LATERAL EPICONDYLITIS: Primary | ICD-10-CM

## 2022-06-28 RX ORDER — MELOXICAM 15 MG/1
15 TABLET ORAL DAILY
Qty: 30 TABLET | Refills: 0 | Status: SHIPPED | OUTPATIENT
Start: 2022-06-28 | End: 2022-07-28

## 2022-06-28 NOTE — LETTER
[address] [facility]  [address] [facility]. [address]  Phone: [phone]  Fax: [phone]    Pardeep Gonsalez MD        June 28, 2022     Patient: Macho Brooks   YOB: 1975   Date of Visit: 6/28/2022       To Whom It May Concern:    Please excuse the above patient from work missed, he was seen in the office today. If you have any questions or concerns, please don't hesitate to call.     Sincerely,        Pardeep Gonsalez MD

## 2022-06-28 NOTE — PROGRESS NOTES
Orthopaedic Hand Surgery Note    Name: Parth Marinelli  YOB: 1975  Gender: male  MRN: [id number]    Follow up: Elbow Pain    HPI: Patient is a 56 y.o. male who return for evaluation of left lateral epicondylitis. Symptoms have been going on for well over a year, he has had 2 injections with good but short-term relief, he is unable to perform most ADLs and he wants a more permanent fix to his problem. ROS/Meds/PSH/PMH/FH/SH: I personally reviewed the patients standard intake form. Pertinents are discussed in the HPI    Physical Examination:  General: Awake and alert. HEENT: Normocephalic, atraumatic  CV/Pulm: Breathing even and unlabored  Circulation: Normal without obvious arterial or venous deficiency. Pulses palpable bilateral upper extremities. Skin: No obvious rashes noted. Lymphatic: No obvious evidence of lymphedema or lymphadenopathy    Musculoskeletal:   Examination on the left upper extremity demonstrates normal sensation to light touch in the median, ulnar and radial distribution, cap refill < 5 seconds in all fingers. There is severe tenderness on the lateral epicondyle, no tenderness on the medial epicondyle or the olecranon, pain on the lateral elbow is aggravated by chairlift test and resisted wrist extension, there is no pain with palpation of the radial tunnel, negative Tinel along the radial nerve tract, no pain with resisted supination, no pain with resisted pronation. Imaging / Electrodiagnostic Tests:     MRI was reviewed with the patient which demonstrates complete tear of the common extensor mass from the lateral epicondyle    Assessment:   1. Left lateral epicondylitis        Plan:  We discussed the diagnosis and different treatment options. We discussed observation, bracing, cortisone injections, therapy and lateral epicondyle debridement with tendon reattachment surgery.   We discussed that lateral epicondylitis is a chronic condition that has been progressing for much longer than the symptoms were evident, this is caused by degeneration of the tendon due to poor vascular supply that occurred over a long period of time, the patient understands that this condition will likely persist for a long time without medical treatment but that a large percentage of patients report improvement of symptoms with conservative measures including injections, braces, therapy and antiinflammatories. However, a small percentage of patients require surgical intervention at some point despite some short-term benefits of conservative treatment. After discussing the risks, benefits and alternatives of all treatment options in detail, the patient elects for left lateral epicondyle debridement and tendon reattachment. Patient understands risks and benefits of LEFT LATERAL EPICONDYLE DEBRIDEMENT AND TENDON REATTACHMENT including but not limited to nerve injury, vessel injury, infection, failure to achieve desired results and possible need for additional surgery. Patient understands and wishes to proceed with surgery. On Exam:   The patient is alert and oriented; ;   Lung auscultation is clear bilaterally   Heart has RRR without murmurs  . Patient voiced accordance and understanding of the information provided and the formulated plan. All questions were answered to the patient's satisfaction during the encounter.     Sebastian Bateman MD  Orthopaedic Surgery  06/28/22  2:09 PM

## 2022-06-29 DIAGNOSIS — M77.12 LEFT LATERAL EPICONDYLITIS: Primary | ICD-10-CM

## 2022-07-25 DIAGNOSIS — M77.12 LEFT LATERAL EPICONDYLITIS: Primary | ICD-10-CM

## 2022-07-28 DIAGNOSIS — M77.12 LEFT LATERAL EPICONDYLITIS: Primary | ICD-10-CM

## 2022-07-28 RX ORDER — METHOCARBAMOL 750 MG/1
TABLET, FILM COATED ORAL
COMMUNITY
End: 2022-07-28

## 2022-07-28 RX ORDER — TIZANIDINE 4 MG/1
TABLET ORAL
COMMUNITY
End: 2022-07-28

## 2022-07-28 RX ORDER — ACETAMINOPHEN 500 MG
500 TABLET ORAL EVERY 6 HOURS PRN
COMMUNITY

## 2022-07-28 RX ORDER — CELECOXIB 100 MG/1
CAPSULE ORAL
COMMUNITY
End: 2022-07-28

## 2022-07-28 RX ORDER — TIZANIDINE 4 MG/1
4 TABLET ORAL EVERY 6 HOURS PRN
COMMUNITY

## 2022-07-28 RX ORDER — MELOXICAM 15 MG/1
TABLET ORAL
COMMUNITY
End: 2022-07-28

## 2022-07-28 RX ORDER — M-VIT,TX,IRON,MINS/CALC/FOLIC 27MG-0.4MG
1 TABLET ORAL DAILY
COMMUNITY

## 2022-07-28 NOTE — PERIOP NOTE
Patient verified name and . Order for consent NOT found in EHR; patient verifies procedure from case posting. Type 1B surgery, phone assessment complete. Orders NOT received. Labs per surgeon: Unknown  Labs per anesthesia protocol: Unknown    Patient answered medical/surgical history questions at their best of ability. All prior to admission medications documented in [facility]. Patient instructed to take the following medications the day of surgery according to anesthesia guidelines with a small sip of water: NONE. On the day before surgery please take Acetaminophen 1000mg in the morning and then again before bed. You may substitute for Tylenol 650 mg. Hold all vitamins 7 days prior to surgery and NSAIDS 5 days prior to surgery. Prescription meds to hold: NONE    Patient instructed on the following:    > Arrive at [address] [place] [address], time of arrival to be called the day before by 1700  > NPO after midnight, unless otherwise indicated, including gum, mints, and ice chips  > Responsible adult must drive patient to the hospital, stay during surgery, and patient will need supervision 24 hours after anesthesia  > Use antibacterial soap in shower the night before surgery and on the morning of surgery  > All piercings must be removed prior to arrival.    > Leave all valuables (money and jewelry) at home but bring insurance card and ID on DOS.   > You may be required to pay a deductible or co-pay on the day of your procedure. You can pre-pay by calling [phone] if your surgery is at the [facility] or [phone] if your surgery is at the [facility]. > Do not wear make-up, nail polish, lotions, cologne, perfumes, powders, or oil on skin. Artificial nails are not permitted.

## 2022-07-28 NOTE — PERIOP NOTE
Dear  Wade Castroxochitl you for completing your phone assessment with me today. Here are your requested surgery instructions. Please call #[phone] with any questions/concerns. Your surgery is scheduled at [facility]: [place], [address]. Please arrive at Outpatient Entrance; pre-op (#[phone]) will call you on the business day before your surgery with your arrival time. If you have any questions on the day of surgery, please call the pre-op dept. at the telephone number above. If you are sick the day of surgery: fever >100 deg F, coughing up colored mucus, or have abdominal sickness (intractable nausea, vomiting or diarrhea) please call [phone] the day of surgery as early as possible and speak to a nurse about your symptoms. They will advise you on next steps. No food or drink after midnight which includes any gum, mints, candy, or ice chips. Please take these medications on the morning of surgery with a small sip of water: NONE. On the day before surgery take Acetaminophen 1000mg in the morning and at bedtime OR Acetaminophen 650mg in the morning, afternoon and bedtime. Please stop all vitamins/supplements 7 days prior to surgery and stop all NSAIDS (ibuprofen, naproxen, aleve, motrin, advil) 5 days before your surgery. A responsible adult must drive you to the hospital, remain in the building during surgery and you will need adult supervision for 24 hours after anesthesia. Please use an antibacterial soap (Dial, Safeguard, etc.) the night before surgery and on the morning of surgery. Do NOT wear: deodorant, make-up, nail polish, lotions, cologne, perfumes, powders or oil on your skin. All piercings/metal/jewelry must be removed prior to arrival.  If you wear contacts then you will need to bring a case to store them in or wear your glasses.      Please leave all your valuables at home but be sure

## 2022-07-31 ENCOUNTER — ANESTHESIA EVENT (OUTPATIENT)
Dept: SURGERY | Age: 47
End: 2022-07-31
Payer: COMMERCIAL

## 2022-07-31 DIAGNOSIS — M77.12 LEFT LATERAL EPICONDYLITIS: Primary | ICD-10-CM

## 2022-07-31 NOTE — DISCHARGE INSTRUCTIONS
Postoperative  Instructions:      Weightbearing or Lifting:  Limit  weight  lifting  to  less  than  1  pound  (coffee  mug)  for  the  first  2  weeks  after  surgery. Dressing  instructions:    Keep  your  dressing  and/or  splint  clean  and  dry  at  all  times. You  can  remove  your  dressing  on  post-operative  day  #5  and  change  with  a  dry/sterile  dressing  or  Band-Aids  as  needed  thereafter. Showering  Instructions:  May  shower  But keep surgical dressing clean and dry until removed as explained above. After dressing is removed, you may allow soapy water to run through the incision during showers but do not scrub. After each shower, pat dry and apply a dry dressing. Do  not  soak  your  Incision in still water or bathtub  for  3  weeks  after  surgery. If  the  incision  gets  wet otherwise,  pat  dry  and  do  not  scrub  the  incision. Do  not  apply  cream  or  lotion  to  incision      Pain  Control:  - You  have  been  given  a  prescription  to  be  taken  as  directed  for  post-operative  pain  control. In  addition,  elevate  the  operative  extremity  above  the  heart  at  all  times  to  prevent  swelling  and  throbbing  pain. - If you develop constipation while taking narcotic pain medications (Norco, Hydrocodone, Percocet, Oxycodone, Dilaudid, Hydromorphone) take  over-the-counter  Colace,  100mg  by  mouth  twice  a  Day. - Nausea  is  a  common  side  effect  of  many  pain  medications. You  will  want  to  eat something  before  taking  your  pain  medicine  to  help  prevent  Nausea. - If  you  are  taking  a  prescription  pain  medication  that  contains  acetaminophen,  we  recommend  that  you  do  not  take  additional  over  the  counter  acetaminophen  (Tylenol®).       Other  pain  relieving  options:   - Using  a  cold  pack  to  ice  the  affected  area  a  few  times  a  day  (15  to  20  minutes  at  a  time)  can  help  to  relieve pain,  reduce  swelling  and  bruising.      - Elevation  of  the  affected  area  can  also  help  to  reduce  pain  and  swelling. Did  you  receive  a  nerve  Block? A  nerve  block  can  provide  pain  relief  for  one  hour  to  two  days  after  your  surgery. As  long  as  the  nerve  block  is  working,  you  will  experience  little  or  no  sensation  in  the  area  the  surgeon  operated  on. As  the  nerve  block  wears  off,  you  will  begin  to  experience  pain  or  discomfort. It  is  very  important  that  you  begin  taking  your  prescribed  pain  medication  before  the  nerve  block  fully  wears  off. The first sign that the nerve block is wearing off is tingling in your fingers. Treating  your  pain  at  the  first  sign  of  the  block  wearing  off  will  ensure  your  pain  is  better  controlled  and  more  tolerable  when  full-sensation  returns. Do  not  wait  until  the  pain  is  intolerable,  as  the  medicine  will  be  less  effective. It  is  better  to  treat  pain  in  advance  than  to  try  and  catch  up. General  Anesthesia or Sedation:      If  you  did  not  receive  a  nerve  block  during  your  surgery,  you  will  need  to  start  taking  your  pain  medication  shortly  after  your  surgery  and  should  continue  to  do  so  as  prescribed  by  your  surgeon. Please  contact us through my chart or call  [phone]  with any concern and ask to speak with Dr Samantha Dean team.  Concerning problems include:      -  Excessive  redness  of  the  incisions      -  Drainage  for  more  than  2  Days after surgery or any foul smelling drainage  -  Fever  of  more  than  101.5  F      Please  call  [phone]  if  you  do  not  receive  or  are  unsure  of  your  first  follow-up  appointment. You  should  see  the  doctor  10-14  days  after  your  Surgery. Thank you for choosing me and [address] [place] [address] for your care.  I will go above and beyond to ensure you receive the best care possible. Geno Jean MD, PhD    ACTIVITY  As tolerated and as directed by your doctor. Bathe or shower as directed by your doctor. DIET  Clear liquids until no nausea or vomiting; then light diet for the first day. Advance to regular diet on second day, unless your doctor orders otherwise. If nausea and vomiting continues, call your doctor. PAIN  Take pain medication as directed by your doctor. Call your doctor if pain is NOT relieved by medication. DO NOT take aspirin of blood thinners unless directed by your doctor. DRESSING CARE       CALL YOUR DOCTOR IF   Excessive bleeding that does not stop after holding pressure over the area  Temperature of 101 degrees F or above  Excessive redness, swelling or bruising, and/ or green or yellow, smelly discharge from incision    AFTER ANESTHESIA   For the first 24 hours: DO NOT Drive, Drink alcoholic beverages, or Make important decisions. Be aware of dizziness following anesthesia and while taking pain medication. APPOINTMENT DATE/ TIME    YOUR DOCTOR'S PHONE NUMBER       DISCHARGE SUMMARY from Nurse    PATIENT INSTRUCTIONS:    After general anesthesia or intravenous sedation, for 24 hours or while taking prescription Narcotics:  Limit your activities  Do not drive and operate hazardous machinery  Do not make important personal or business decisions  Do  not drink alcoholic beverages  If you have not urinated within 8 hours after discharge, please contact your surgeon on call. *  Please give a list of your current medications to your Primary Care Provider. *  Please update this list whenever your medications are discontinued, doses are      changed, or new medications (including over-the-counter products) are added. *  Please carry medication information at all times in case of emergency situations.       These are general instructions for a healthy lifestyle:    No smoking/ No tobacco products/ Avoid exposure to second hand smoke    Surgeon General's Warning:  Quitting smoking now greatly reduces serious risk to your health. Obesity, smoking, and sedentary lifestyle greatly increases your risk for illness    A healthy diet, regular physical exercise & weight monitoring are important for maintaining a healthy lifestyle    You may be retaining fluid if you have a history of heart failure or if you experience any of the following symptoms:  Weight gain of 3 pounds or more overnight or 5 pounds in a week, increased swelling in our hands or feet or shortness of breath while lying flat in bed. Please call your doctor as soon as you notice any of these symptoms; do not wait until your next office visit. Recognize signs and symptoms of STROKE:    F-face looks uneven    A-arms unable to move or move unevenly    S-speech slurred or non-existent    T-time-call 911 as soon as signs and symptoms begin-DO NOT go       Back to bed or wait to see if you get better-TIME IS BRAIN.

## 2022-07-31 NOTE — H&P
History and Physical    Subjective:     Shell Chan is a 56 y.o. scheduled for left lateral epicondyle debridement and tendon reattachment today. Past Medical History:   Diagnosis Date    Asthma     as a kid - has outgrown    Diabetes mellitus ([address]     no meds at this time    Hypertension     no meds at this time      Past Surgical History:   Procedure Laterality Date    FRACTURE SURGERY Left 2016    génesis placed in left leg lower leg    HERNIA REPAIR      as a 12year old    KNEE ARTHROSCOPY Left 2017    MOUTH SURGERY      all teeth removed wears dentures    ORTHOPEDIC SURGERY      WISDOM TOOTH EXTRACTION       Social History     Tobacco Use    Smoking status: Former     Packs/day: 1.00     Years: 20.00     Pack years: 20.00     Types: Cigarettes     Quit date:      Years since quittin.5    Smokeless tobacco: Never   Substance Use Topics    Alcohol use: Yes     Comment: rare use 2-3 times per month on weekends       Allergies   Allergen Reactions    Metformin Other (See Comments)     Messes with stomach        Review of Systems:  A comprehensive review of systems was negative except for that written in the History of Present Illness. Objective:     Physical Exam:   NAD, heart with regular rate and rhythm, lungs clear to auscultation    Extremity exam:  Examination on the left upper extremity demonstrates normal sensation to light touch in the median, ulnar and radial distribution, cap refill < 5 seconds in all fingers. There is severe tenderness on the lateral epicondyle, no tenderness on the medial epicondyle or the olecranon, pain on the lateral elbow is aggravated by chairlift test and resisted wrist extension, there is no pain with palpation of the radial tunnel, negative Tinel along the radial nerve tract, no pain with resisted supination, no pain with resisted pronation.     Imaging / Electrodiagnostic Tests:     MRI was reviewed with the patient which demonstrates complete tear of the

## 2022-08-01 ENCOUNTER — ANESTHESIA (OUTPATIENT)
Dept: SURGERY | Age: 47
End: 2022-08-01
Payer: COMMERCIAL

## 2022-08-01 ENCOUNTER — TELEPHONE (OUTPATIENT)
Dept: ORTHOPEDIC SURGERY | Age: 47
End: 2022-08-01

## 2022-08-01 ENCOUNTER — HOSPITAL ENCOUNTER (OUTPATIENT)
Age: 47
Setting detail: OUTPATIENT SURGERY
Discharge: HOME | End: 2022-08-01
Attending: ORTHOPAEDIC SURGERY | Admitting: ORTHOPAEDIC SURGERY
Payer: COMMERCIAL

## 2022-08-01 VITALS
SYSTOLIC BLOOD PRESSURE: 138 MMHG | HEIGHT: 72 IN | BODY MASS INDEX: 31.83 KG/M2 | DIASTOLIC BLOOD PRESSURE: 88 MMHG | WEIGHT: 235 LBS | HEART RATE: 87 BPM | OXYGEN SATURATION: 93 % | TEMPERATURE: 97.7 F | RESPIRATION RATE: 18 BRPM

## 2022-08-01 LAB
GLUCOSE BLD STRIP.AUTO-MCNC: 288 MG/DL (ref 65–100)
GLUCOSE BLD STRIP.AUTO-MCNC: 302 MG/DL (ref 65–100)
GLUCOSE BLD STRIP.AUTO-MCNC: 332 MG/DL (ref 65–100)
GLUCOSE BLD STRIP.AUTO-MCNC: 383 MG/DL (ref 65–100)
SERVICE CMNT-IMP: ABNORMAL

## 2022-08-01 PROCEDURE — 7100000000 HC PACU RECOVERY - FIRST 15 MIN: Performed by: ORTHOPAEDIC SURGERY

## 2022-08-01 PROCEDURE — C1713 ANCHOR/SCREW BN/BN,TIS/BN: HCPCS | Performed by: ORTHOPAEDIC SURGERY

## 2022-08-01 PROCEDURE — 3700000000 HC ANESTHESIA ATTENDED CARE: Performed by: ORTHOPAEDIC SURGERY

## 2022-08-01 PROCEDURE — 3600000004 HC SURGERY LEVEL 4 BASE: Performed by: ORTHOPAEDIC SURGERY

## 2022-08-01 PROCEDURE — 3600000014 HC SURGERY LEVEL 4 ADDTL 15MIN: Performed by: ORTHOPAEDIC SURGERY

## 2022-08-01 PROCEDURE — 6360000002 HC RX W HCPCS: Performed by: ANESTHESIOLOGY

## 2022-08-01 PROCEDURE — 3700000001 HC ADD 15 MINUTES (ANESTHESIA): Performed by: ORTHOPAEDIC SURGERY

## 2022-08-01 PROCEDURE — 6370000000 HC RX 637 (ALT 250 FOR IP): Performed by: ANESTHESIOLOGY

## 2022-08-01 PROCEDURE — 7100000011 HC PHASE II RECOVERY - ADDTL 15 MIN: Performed by: ORTHOPAEDIC SURGERY

## 2022-08-01 PROCEDURE — 6360000002 HC RX W HCPCS: Performed by: NURSE ANESTHETIST, CERTIFIED REGISTERED

## 2022-08-01 PROCEDURE — 2500000003 HC RX 250 WO HCPCS: Performed by: NURSE ANESTHETIST, CERTIFIED REGISTERED

## 2022-08-01 PROCEDURE — 2720000010 HC SURG SUPPLY STERILE: Performed by: ORTHOPAEDIC SURGERY

## 2022-08-01 PROCEDURE — 7100000010 HC PHASE II RECOVERY - FIRST 15 MIN: Performed by: ORTHOPAEDIC SURGERY

## 2022-08-01 PROCEDURE — 6360000002 HC RX W HCPCS: Performed by: NURSE PRACTITIONER

## 2022-08-01 PROCEDURE — 2580000003 HC RX 258: Performed by: ANESTHESIOLOGY

## 2022-08-01 PROCEDURE — 2709999900 HC NON-CHARGEABLE SUPPLY: Performed by: ORTHOPAEDIC SURGERY

## 2022-08-01 PROCEDURE — 82962 GLUCOSE BLOOD TEST: CPT

## 2022-08-01 PROCEDURE — 76942 ECHO GUIDE FOR BIOPSY: CPT | Performed by: ANESTHESIOLOGY

## 2022-08-01 PROCEDURE — 2500000003 HC RX 250 WO HCPCS: Performed by: ANESTHESIOLOGY

## 2022-08-01 DEVICE — 1.8MM Q-FIX ALL SUTURE ANCHOR
Type: IMPLANTABLE DEVICE | Site: ARM | Status: FUNCTIONAL
Brand: Q-FIX

## 2022-08-01 RX ORDER — ROPIVACAINE HYDROCHLORIDE 5 MG/ML
INJECTION, SOLUTION EPIDURAL; INFILTRATION; PERINEURAL
Status: COMPLETED | OUTPATIENT
Start: 2022-08-01 | End: 2022-08-01

## 2022-08-01 RX ORDER — FENTANYL CITRATE 50 UG/ML
100 INJECTION, SOLUTION INTRAMUSCULAR; INTRAVENOUS
Status: COMPLETED | OUTPATIENT
Start: 2022-08-01 | End: 2022-08-01

## 2022-08-01 RX ORDER — ONDANSETRON 4 MG/1
4 TABLET, ORALLY DISINTEGRATING ORAL EVERY 8 HOURS PRN
Qty: 20 TABLET | Refills: 0 | Status: SHIPPED | OUTPATIENT
Start: 2022-08-01

## 2022-08-01 RX ORDER — LIDOCAINE HYDROCHLORIDE 20 MG/ML
INJECTION, SOLUTION EPIDURAL; INFILTRATION; INTRACAUDAL; PERINEURAL PRN
Status: DISCONTINUED | OUTPATIENT
Start: 2022-08-01 | End: 2022-08-01 | Stop reason: SDUPTHER

## 2022-08-01 RX ORDER — DEXTROSE MONOHYDRATE 100 MG/ML
INJECTION, SOLUTION INTRAVENOUS CONTINUOUS PRN
Status: DISCONTINUED | OUTPATIENT
Start: 2022-08-01 | End: 2022-08-01 | Stop reason: HOSPADM

## 2022-08-01 RX ORDER — SODIUM CHLORIDE 0.9 % (FLUSH) 0.9 %
5-40 SYRINGE (ML) INJECTION PRN
Status: DISCONTINUED | OUTPATIENT
Start: 2022-08-01 | End: 2022-08-01 | Stop reason: HOSPADM

## 2022-08-01 RX ORDER — DEXAMETHASONE SODIUM PHOSPHATE 4 MG/ML
INJECTION, SOLUTION INTRA-ARTICULAR; INTRALESIONAL; INTRAMUSCULAR; INTRAVENOUS; SOFT TISSUE
Status: COMPLETED | OUTPATIENT
Start: 2022-08-01 | End: 2022-08-01

## 2022-08-01 RX ORDER — OXYCODONE HYDROCHLORIDE 5 MG/1
5 TABLET ORAL EVERY 4 HOURS PRN
Qty: 28 TABLET | Refills: 0 | Status: SHIPPED | OUTPATIENT
Start: 2022-08-01 | End: 2022-08-06

## 2022-08-01 RX ORDER — SODIUM CHLORIDE, SODIUM LACTATE, POTASSIUM CHLORIDE, CALCIUM CHLORIDE 600; 310; 30; 20 MG/100ML; MG/100ML; MG/100ML; MG/100ML
INJECTION, SOLUTION INTRAVENOUS CONTINUOUS
Status: DISCONTINUED | OUTPATIENT
Start: 2022-08-01 | End: 2022-08-01 | Stop reason: HOSPADM

## 2022-08-01 RX ORDER — SODIUM CHLORIDE 0.9 % (FLUSH) 0.9 %
5-40 SYRINGE (ML) INJECTION EVERY 12 HOURS SCHEDULED
Status: DISCONTINUED | OUTPATIENT
Start: 2022-08-01 | End: 2022-08-01 | Stop reason: HOSPADM

## 2022-08-01 RX ORDER — PROPOFOL 10 MG/ML
INJECTION, EMULSION INTRAVENOUS CONTINUOUS PRN
Status: DISCONTINUED | OUTPATIENT
Start: 2022-08-01 | End: 2022-08-01 | Stop reason: SDUPTHER

## 2022-08-01 RX ORDER — OXYCODONE HYDROCHLORIDE 5 MG/1
5 TABLET ORAL
Status: DISCONTINUED | OUTPATIENT
Start: 2022-08-01 | End: 2022-08-01 | Stop reason: HOSPADM

## 2022-08-01 RX ORDER — MIDAZOLAM HYDROCHLORIDE 2 MG/2ML
2 INJECTION, SOLUTION INTRAMUSCULAR; INTRAVENOUS
Status: COMPLETED | OUTPATIENT
Start: 2022-08-01 | End: 2022-08-01

## 2022-08-01 RX ORDER — PROPOFOL 10 MG/ML
INJECTION, EMULSION INTRAVENOUS PRN
Status: DISCONTINUED | OUTPATIENT
Start: 2022-08-01 | End: 2022-08-01 | Stop reason: SDUPTHER

## 2022-08-01 RX ORDER — ACETAMINOPHEN 500 MG
1000 TABLET ORAL ONCE
Status: COMPLETED | OUTPATIENT
Start: 2022-08-01 | End: 2022-08-01

## 2022-08-01 RX ORDER — LIDOCAINE HYDROCHLORIDE 10 MG/ML
1 INJECTION, SOLUTION INFILTRATION; PERINEURAL
Status: DISCONTINUED | OUTPATIENT
Start: 2022-08-01 | End: 2022-08-01 | Stop reason: HOSPADM

## 2022-08-01 RX ORDER — DIPHENHYDRAMINE HYDROCHLORIDE 50 MG/ML
12.5 INJECTION INTRAMUSCULAR; INTRAVENOUS
Status: DISCONTINUED | OUTPATIENT
Start: 2022-08-01 | End: 2022-08-01 | Stop reason: HOSPADM

## 2022-08-01 RX ORDER — LIDOCAINE HYDROCHLORIDE AND EPINEPHRINE 20; 5 MG/ML; UG/ML
INJECTION, SOLUTION EPIDURAL; INFILTRATION; INTRACAUDAL; PERINEURAL PRN
Status: DISCONTINUED | OUTPATIENT
Start: 2022-08-01 | End: 2022-08-01 | Stop reason: SDUPTHER

## 2022-08-01 RX ORDER — HYDROMORPHONE HYDROCHLORIDE 2 MG/ML
0.5 INJECTION, SOLUTION INTRAMUSCULAR; INTRAVENOUS; SUBCUTANEOUS EVERY 10 MIN PRN
Status: DISCONTINUED | OUTPATIENT
Start: 2022-08-01 | End: 2022-08-01 | Stop reason: HOSPADM

## 2022-08-01 RX ORDER — SODIUM CHLORIDE 9 MG/ML
INJECTION, SOLUTION INTRAVENOUS PRN
Status: DISCONTINUED | OUTPATIENT
Start: 2022-08-01 | End: 2022-08-01 | Stop reason: HOSPADM

## 2022-08-01 RX ORDER — PROCHLORPERAZINE EDISYLATE 5 MG/ML
5 INJECTION INTRAMUSCULAR; INTRAVENOUS
Status: DISCONTINUED | OUTPATIENT
Start: 2022-08-01 | End: 2022-08-01 | Stop reason: HOSPADM

## 2022-08-01 RX ADMIN — Medication 2000 MG: at 07:39

## 2022-08-01 RX ADMIN — DEXAMETHASONE SODIUM PHOSPHATE 4 MG: 4 INJECTION, SOLUTION INTRAMUSCULAR; INTRAVENOUS at 06:53

## 2022-08-01 RX ADMIN — ROPIVACAINE HYDROCHLORIDE 20 ML: 5 INJECTION, SOLUTION EPIDURAL; INFILTRATION; PERINEURAL at 06:53

## 2022-08-01 RX ADMIN — PROPOFOL 50 MG: 10 INJECTION, EMULSION INTRAVENOUS at 07:40

## 2022-08-01 RX ADMIN — PROPOFOL 100 MCG/KG/MIN: 10 INJECTION, EMULSION INTRAVENOUS at 07:40

## 2022-08-01 RX ADMIN — PROPOFOL 30 MG: 10 INJECTION, EMULSION INTRAVENOUS at 07:43

## 2022-08-01 RX ADMIN — LIDOCAINE HYDROCHLORIDE,EPINEPHRINE BITARTRATE 10 ML: 20; .005 INJECTION, SOLUTION EPIDURAL; INFILTRATION; INTRACAUDAL; PERINEURAL at 06:53

## 2022-08-01 RX ADMIN — FENTANYL CITRATE 100 MCG: 50 INJECTION, SOLUTION INTRAMUSCULAR; INTRAVENOUS at 06:53

## 2022-08-01 RX ADMIN — INSULIN HUMAN 10 UNITS: 100 INJECTION, SOLUTION PARENTERAL at 06:48

## 2022-08-01 RX ADMIN — ACETAMINOPHEN 1000 MG: 500 TABLET, FILM COATED ORAL at 07:14

## 2022-08-01 RX ADMIN — MIDAZOLAM 2 MG: 1 INJECTION INTRAMUSCULAR; INTRAVENOUS at 06:53

## 2022-08-01 RX ADMIN — LIDOCAINE HYDROCHLORIDE 100 MG: 20 INJECTION, SOLUTION EPIDURAL; INFILTRATION; INTRACAUDAL; PERINEURAL at 07:40

## 2022-08-01 RX ADMIN — INSULIN HUMAN 10 UNITS: 100 INJECTION, SOLUTION PARENTERAL at 07:14

## 2022-08-01 RX ADMIN — SODIUM CHLORIDE, POTASSIUM CHLORIDE, SODIUM LACTATE AND CALCIUM CHLORIDE: 600; 310; 30; 20 INJECTION, SOLUTION INTRAVENOUS at 06:59

## 2022-08-01 NOTE — OP NOTE
Hand Surgery Operative Note      Cristiano Mariano   56 y.o.   male   8/1/2022    Pre-op diagnosis: Left Lateral Epicondylitis    Post op diagnosis: same    Procedure: Left Lateral epicondyle debridement and Tendon reattachment    Surgeon: Lula Gruber MD, PhD      Anesthesia: Regional block + MAC    Tourniquet time:   Total Tourniquet Time Documented:  Arm  (Left) - 24 minutes  Total: Arm  (Left) - 24 minutes      Blood Loss: Minimal    Implants  Implant Name Type Inv. Item Serial No.  Lot No. LRB No. Used Action   ANCHOR SUT DIA1. 8MM FOR ACET LABRAL REP Q-FIX - [id number]  ANCHOR SUT DIA1. 8MM FOR ACET LABRAL REP Q-FIX  [facility] AND GEOVANNIADA Haynesa [facility] [id number] Left 2 Implanted       Procedure indications: Patient with recalcitrant lateral elbow pain which has failed conservative measures including therapy, steroid injection and antiinflammatories. MRI changes are consistent with lateral epicondylitis. After Thorough discussion, the patient decided to proceed with surgical management. We discussed in detail surgical risks including scar, pain, bleeding, infection, anesthetic risks, neurovascular injury, need for further surgery,  weakness, stiffness, risk of death and potential risk of other unforseen complication. Procedure description:    A non-sterile tourniquet was placed on the arm. The upper extremity was pre scrubbed and then prepped and draped in routine sterile fashion. An incisional timeout was performed re-confirming the correct patient, surgical site and procedure, as well as verifying antibiotics. A lateral approach was used over the lateral epicondyle and extended distally for 3 cms. Bovie dissection was carried down to the fascia and the interval between [facility] and ECRL was incised with a knife. The underlying ECRB was identified and sharply debrided with a knife. There was significant mucoid degeneration of the ECRB.  The radiocapitellar joint was incised and a plica was excised, thorough synovectomy was done with a knife. The cartilage was degenerative in the anterior aspect of the capitellum but without full thickness defects. Two S&N suture anchors were applied, one proximal on the lateral epicondyle and one more distal. The ECRB was repaired as well as the ECRL and EDC. The interval was closed with #2 fiberwire until a tight seal was achieved. The topaz radiofrequency wand was then used over the repaired tendons. The tourniquet was deflated and hemostasis was ensured. The wounds were then thoroughly irrigated and closed in layered fashion with 3-0 vicryl and 4-0 Stratafix. Disposition: To PACU with no complications and follow up per routine. Precautions include avoidance of heavy and repetitive lifting for 2 weeks, when an appointment for follow up and suture removal will take place.     Gilberto Branham MD, PhD  Orthopaedic Surgery  08/01/22  8:12 AM

## 2022-08-01 NOTE — ANESTHESIA PROCEDURE NOTES
Peripheral Block    Patient location during procedure: pre-op  Reason for block: post-op pain management and at surgeon's request  Start time: 8/1/2022 6:53 AM  End time: 8/1/2022 6:55 AM  Staffing  Performed: anesthesiologist   Anesthesiologist: Patrica Britton MD  Preanesthetic Checklist  Completed: patient identified, IV checked, site marked, risks and benefits discussed, surgical/procedural consents, equipment checked, pre-op evaluation, timeout performed, anesthesia consent given, oxygen available and monitors applied/VS acknowledged  Peripheral Block   Patient position: sitting  Prep: ChloraPrep  Provider prep: sterile gloves and mask  Patient monitoring: cardiac monitor, continuous pulse ox, frequent blood pressure checks, IV access, oxygen and responsive to questions  Block type: Brachial plexus  Supraclavicular  Laterality: left  Injection technique: single-shot  Guidance: ultrasound guided    Needle   Needle type: insulated echogenic nerve stimulator needle   Needle localization: ultrasound guidance  Assessment   Injection assessment: negative aspiration for heme, no paresthesia on injection, local visualized surrounding nerve on ultrasound and no intravascular symptoms  Paresthesia pain: none  Slow fractionated injection: yes  Hemodynamics: stable  Real-time US image taken/store: yes  Outcomes: uncomplicated and patient tolerated procedure well    Additional Notes  Ultrasound image taken and stored in chart   Medications Administered  ropivacaine (NAROPIN) injection 0.5% - Perineural   20 mL - 8/1/2022 6:53:00 AM  dexamethasone 4 MG/ML - Perineural   4 mg - 8/1/2022 6:53:00 AM

## 2022-08-01 NOTE — PERIOP NOTE
PACU DISCHARGE NOTE    Patient's wife, Jackie Denny, voiced understanding of discharge instructions. Vital signs stable, pain well controlled, alert and oriented times three or at baseline, follow up per surgeon, no anesthetic complications.

## 2022-08-01 NOTE — ANESTHESIA POSTPROCEDURE EVALUATION
Department of Anesthesiology  Postprocedure Note    Patient: Karen Lomeli  MRN: [id number]  YOB: 1975  Date of evaluation: 8/1/2022      Procedure Summary     Date: 08/01/22 Room / Location: [facility] OP OR 05 / [facility]    Anesthesia Start: 5452 Anesthesia Stop: 7773    Procedure: Left lateral epicondylitis debridement with tendon reattachement (Left: Elbow) Diagnosis:       Epicondylitis, lateral, left      (Epicondylitis, lateral, left [M77.12])    Surgeons: Karen Lomeli MD Responsible Provider: Duane Limes, MD    Anesthesia Type: TIVA ASA Status: 3          Anesthesia Type: No value filed. María Elena Phase I: María Elena Score: 9    María Elena Phase II: María Elena Score: 9      Anesthesia Post Evaluation    Patient location during evaluation: PACU  Patient participation: complete - patient participated  Level of consciousness: awake and alert  Airway patency: patent  Nausea: well controlled. Complications: no  Cardiovascular status: acceptable.   Respiratory status: acceptable  Hydration status: stable

## 2022-08-01 NOTE — ANESTHESIA PRE PROCEDURE
Department of Anesthesiology  Preprocedure Note       Name:  Farhat Grimes   Age:  56 y.o.  :  1975                                          MRN:  [id number]         Date:  2022      Surgeon: Napoleon Wheat):  Farhat Grimes MD    Procedure: Procedure(s):  Left lateral epicondylitis debridement with tendon reattachement    Medications prior to admission:   Prior to Admission medications    Medication Sig Start Date End Date Taking? Authorizing Provider   acetaminophen (TYLENOL) 500 MG tablet Take 500 mg by mouth every 6 hours as needed for Pain   Yes Historical Provider, MD   Multiple Vitamins-Minerals (THERAPEUTIC MULTIVITAMIN-MINERALS) tablet Take 1 tablet by mouth in the morning.    Yes Historical Provider, MD   tiZANidine (ZANAFLEX) 4 MG tablet Take 4 mg by mouth every 6 hours as needed   Yes Historical Provider, MD   meloxicam (MOBIC) 15 MG tablet Take 1 tablet by mouth daily 22  Farhat Grimes MD       Current medications:    Current Facility-Administered Medications   Medication Dose Route Frequency Provider Last Rate Last Admin    ceFAZolin (ANCEF) 2000 mg in sterile water 20 mL IV syringe  2,000 mg IntraVENous On Call to [address], APRN - CNP        sodium chloride flush 0.9 % injection 5-40 mL  5-40 mL IntraVENous 2 times per day Monse Faith, APRN - CNP        sodium chloride flush 0.9 % injection 5-40 mL  5-40 mL IntraVENous PRN Fredy Hamm, APRN - CNP        0.9 % sodium chloride infusion   IntraVENous PRN Monse Faith, APRN - CNP        lidocaine 1 % injection 1 mL  1 mL IntraDERmal Once PRN Chayito Sherman MD        acetaminophen (TYLENOL) tablet 1,000 mg  1,000 mg Oral Once Chayito Sherman MD        fentaNYL (SUBLIMAZE) injection 100 mcg  100 mcg IntraVENous Once PRN Chayito Sherman MD        lactated ringers infusion   IntraVENous Continuous Chayito Sherman MD        sodium chloride flush 0.9 % injection 5-40 mL  5-40 mL IntraVENous 2 times per day Chayito Sherman MD  sodium chloride flush 0.9 % injection 5-40 mL  5-40 mL IntraVENous PRN Fox Daniels MD        0.9 % sodium chloride infusion   IntraVENous PRN Fox Daniels MD        midazolam PF (VERSED) injection 2 mg  2 mg IntraVENous Once PRN Fox Daniels MD        insulin regular (HUMULIN R;NOVOLIN R) injection 10 Units  10 Units IntraVENous Once Fox Daniels MD           Allergies:     Allergies   Allergen Reactions    Metformin Other (See Comments)     Messes with stomach       Problem List:    Patient Active Problem List   Diagnosis Code    Former tobacco use Z87.891    MDD (major depressive disorder) F32.9    HTN (hypertension) I10    DDD (degenerative disc disease), cervical M50.30    AR (allergic rhinitis) J30.9    GERD (gastroesophageal reflux disease) K21.9    Left lateral epicondylitis M77.12    DDD (degenerative disc disease), lumbosacral M51.37    DM2 (diabetes mellitus, type 2) ([facility] [address] E11.9    Asthma J45.909    DDD (degenerative disc disease), thoracic M51.34    Spondylolisthesis, lumbar region M43.16       Past Medical History:        Diagnosis Date    Asthma     as a kid - has outgrown    Diabetes mellitus ([facility] [address]     no meds at this time    Hypertension     no meds at this time       Past Surgical History:        Procedure Laterality Date    FRACTURE SURGERY Left 2016    génesis placed in left leg lower leg    HERNIA REPAIR      as a 12year old    KNEE ARTHROSCOPY Left 2017    MOUTH SURGERY      all teeth removed wears dentures    ORTHOPEDIC SURGERY      WISDOM TOOTH EXTRACTION         Social History:    Social History     Tobacco Use    Smoking status: Former     Packs/day: 1.00     Years: 20.00     Pack years: 20.00     Types: Cigarettes     Quit date:      Years since quittin.5    Smokeless tobacco: Never   Substance Use Topics    Alcohol use: Yes     Comment: rare use 2-3 times per month on weekends                                Counseling given: Not Answered      Vital Signs (Current):   Vitals:    07/28/22 1550 08/01/22 0614   BP:  (!) 158/95   Pulse:  85   Resp:  18   Temp:  98.6 °F (37 °C)   TempSrc:  Oral   SpO2:  98%   Weight: 235 lb (106.6 kg) 235 lb (106.6 kg)   Height: 6' (1.829 m)                                               BP Readings from Last 3 Encounters:   08/01/22 (!) 158/95   10/05/21 (!) 145/93       NPO Status:                                                                                 BMI:   Wt Readings from Last 3 Encounters:   08/01/22 235 lb (106.6 kg)   01/04/22 236 lb (107 kg)   10/05/21 227 lb (103 kg)     Body mass index is 31.87 kg/m².     CBC: No results found for: WBC, RBC, HGB, HCT, MCV, RDW, PLT    CMP:   Lab Results   Component Value Date/Time     10/05/2021 03:05 PM    K 4.6 10/05/2021 03:05 PM    CL 96 10/05/2021 03:05 PM    CO2 24 10/05/2021 03:05 PM    BUN 7 10/05/2021 03:05 PM    CREATININE 0.52 10/05/2021 03:05 PM    GFRAA 149 10/05/2021 03:05 PM    AGRATIO 1.7 10/05/2021 03:05 PM    GLUCOSE 383 10/05/2021 03:05 PM    PROT 7.5 10/05/2021 03:05 PM    CALCIUM 9.7 10/05/2021 03:05 PM    BILITOT 0.9 10/05/2021 03:05 PM    ALKPHOS 118 10/05/2021 03:05 PM    AST 23 10/05/2021 03:05 PM    ALT 37 10/05/2021 03:05 PM       POC Tests:   Recent Labs     08/01/22  0635   POCGLU 383*       Coags: No results found for: PROTIME, INR, APTT    HCG (If Applicable): No results found for: PREGTESTUR, PREGSERUM, HCG, HCGQUANT     ABGs: No results found for: PHART, PO2ART, IWZ0HMG, IOF6XQI, BEART, X9VSHBXC     Type & Screen (If Applicable):  No results found for: LABABO, LABRH    Drug/Infectious Status (If Applicable):  No results found for: HIV, HEPCAB    COVID-19 Screening (If Applicable): No results found for: COVID19        Anesthesia Evaluation  Patient summary reviewed and Nursing notes reviewed no history of anesthetic complications:   Airway: Mallampati: III     Neck ROM: limited  Comment: Short chin, heavy beard and mildly limited neck ROM due to DDD  Mouth opening: > = 3 FB   Dental: normal exam         Pulmonary:Negative Pulmonary ROS breath sounds clear to auscultation                             Cardiovascular:  Exercise tolerance: no interval change, Denied chest pain, SOB, syncope   (+) hypertension (no meds since weight loss):, hyperlipidemia        Rhythm: regular  Rate: normal                    Neuro/Psych:   (+) depression/anxiety             GI/Hepatic/Renal:   (+) GERD:,           Endo/Other:    (+) Diabetes (was taking metformin but took himself off due to SE and can't afford other meds)poorly controlled, , : arthritis:., .                 Abdominal:             Vascular: negative vascular ROS. Other Findings:           Anesthesia Plan      TIVA     ASA 3     (PNB + TIVA. Discussed increased risk given poorly controlled DM. Patient unable to tolerate or afford meds currently. Will treat preop and encouraged patient to follow up with PCP soon)  Induction: intravenous. Anesthetic plan and risks discussed with patient.               Post-op pain plan if not by surgeon: single peripheral nerve block            Octavia Frye MD   8/1/2022

## 2022-08-05 ENCOUNTER — TELEPHONE (OUTPATIENT)
Dept: ORTHOPEDIC SURGERY | Age: 47
End: 2022-08-05

## 2022-08-05 ENCOUNTER — OFFICE VISIT (OUTPATIENT)
Dept: ORTHOPEDIC SURGERY | Age: 47
End: 2022-08-05

## 2022-08-05 DIAGNOSIS — M77.12 LEFT LATERAL EPICONDYLITIS: Primary | ICD-10-CM

## 2022-08-05 PROCEDURE — 99024 POSTOP FOLLOW-UP VISIT: CPT | Performed by: ORTHOPAEDIC SURGERY

## 2022-08-05 RX ORDER — METHYLPREDNISOLONE 4 MG/1
TABLET ORAL
Qty: 1 KIT | Refills: 0 | Status: SHIPPED | OUTPATIENT
Start: 2022-08-05

## 2022-08-05 NOTE — TELEPHONE ENCOUNTER
Called pt back, pt states ebenezer has irritated skin causing puss like blisters to form, I advised pt to come into the [address] office today as soon as possible to have his arm check out. Pt voiced understanding and is on his way at this moment.

## 2022-08-05 NOTE — PROGRESS NOTES
Orthopaedic Hand Surgery Note    Name: Janis Walker  Age: 56 y.o. YOB: 1975  Gender: male  MRN: [id number]    Post Operative Visit: Left lateral epicondylitis debridement with tendon reattachement - Left    HPI: Patient is status post Left lateral epicondylitis debridement with tendon reattachement - Left on 8/1/2022. Patient reports swelling and stiffness of the left elbow, patient had a rash after surgery. Physical Examination:  Well-healed surgical wounds. Sensation is intact in all fingers. Motor exam reveals no deficits. Erythematous rash covering the left arm from the level of the tourniquet all the way to the fingertips, Steri-Strips in place, moderate swelling of the left elbow. Imaging:     none    Assessment:   1. Left lateral epicondylitis         Status post Left lateral epicondylitis debridement with tendon reattachement - Left on 8/1/2022    Plan:  We discussed the post operative course and progression.   Medrol Dosepak to help with the swelling and the rash, he may start therapy this week, he will wear the wrist brace while driving and while sleeping, I will reassess in 2 weeks to ensure the elbow motion as well as swelling is improving    Janis Walker MD  08/05/22  10:27 AM

## 2022-08-05 NOTE — LETTER
[address] [facility]  [address] [place] [address],[facility] [address]  Phone: [phone]  Fax: [phone]    Christy Sue MD        August 5, 2022     Patient: Christy Brothersal   YOB: 1975   Date of Visit: 8/5/2022       To Whom It May Concern: It is my medical opinion that Katelyn Riley will be out of work for 2 weeks. If you have any questions or concerns, please don't hesitate to call.     Sincerely,        Christy Sue MD

## 2022-08-16 ENCOUNTER — OFFICE VISIT (OUTPATIENT)
Dept: ORTHOPEDIC SURGERY | Age: 47
End: 2022-08-16

## 2022-08-16 DIAGNOSIS — M62.81 MUSCLE WEAKNESS (GENERALIZED): ICD-10-CM

## 2022-08-16 DIAGNOSIS — M77.12 LEFT LATERAL EPICONDYLITIS: Primary | ICD-10-CM

## 2022-08-16 DIAGNOSIS — M25.522 LEFT ELBOW PAIN: ICD-10-CM

## 2022-08-16 PROCEDURE — 99024 POSTOP FOLLOW-UP VISIT: CPT | Performed by: ORTHOPAEDIC SURGERY

## 2022-08-16 NOTE — LETTER
[address] [facility]  [address] [facility]. [address]  Phone: [phone]  Fax: [phone]    Vijaya Mcdowell MD        August 16, 2022     Patient: Vijaya Hams   YOB: 1975   Date of Visit: 8/16/2022       To Whom It May Concern: It is my medical opinion that Katelyn Osvaldo return back to work and is restricted to no use of left hand for 4 weeks. If you have any questions or concerns, please don't hesitate to call.     Sincerely,        Vijaya Mcdowell MD

## 2022-08-16 NOTE — LETTER
[address] [facility]  [address] [facility]. [address]  Phone: [phone]  Fax: [phone]    Mirian Michelle MD        August 16, 2022     Patient: Mirian Michelle   YOB: 1975   Date of Visit: 8/16/2022       To Whom It May Concern: It is my medical opinion that Katelyn Osvaldo can return back to work restricted to no use of the left hand for 4 weeks. If you have any questions or concerns, please don't hesitate to call.     Sincerely,        Mirian Michelle MD

## 2022-08-16 NOTE — PROGRESS NOTES
[address] [facility]  [address] [facility]. [facility] [address] [address]  Dept: [phone]      Occupational Therapy Initial Assessment     Referring MD: Unknown, Provider, MD    Diagnosis: No diagnosis found. Surgery: Date 8/1/22     Therapy precautions: Tendon precautions    History of injury/onset : April 2021: fell when leg gave out and he hit a metal container with his elbow    Total Direct Treatment Time: 15 min                       Total In Office Time: 45 min    Preferred Name:  Ambar Andino:   Past Medical History:   Diagnosis Date    Asthma     as a kid - has outgrown    Diabetes mellitus ([facility] [address]     no meds at this time    Hypertension     no meds at this time   ,   Past Surgical History:   Procedure Laterality Date    ELBOW SURGERY Left 8/1/2022    Left lateral epicondylitis debridement with tendon reattachement performed by Nayeli Jung MD at [facility] Left 2016    génesis placed in left leg lower leg    HERNIA REPAIR      as a 12year old    KNEE ARTHROSCOPY Left 2017    MOUTH SURGERY      all teeth removed wears dentures    ORTHOPEDIC SURGERY      WISDOM TOOTH EXTRACTION        Medications. : Reviewed in chart  Allergies: Allergies   Allergen Reactions    Metformin Other (See Comments)     Messes with stomach    Chlorhexidine Gluconate Rash     Chlora prep causes blistering rash         SUBJECTIVE     Current Symptoms/Chief complaints: No chief complaint on file. Chief complaint/history of injury:   Date symptoms began: April 2021  Nature of condition:Chronic (continuous duration > 3 months)  Primary cause of current episode: Post-surgical  How did symptoms start: when fell at work  Describe current symptoms: elbow pain and stiffness    Received previous outpatient therapy?   Yes, at [facility] prior to surgery with no relief      Pain Assessment:  Pain location: L elbow  Average Pain/symptom intensity (0-10 scale)  Last 24 hours: 3/10  Last week (1-7 days): 5/10  How often do you feel symptoms? Constant (% of time)  Description: aching  Aggravating factors: reaching, upper body dressing/grooming, and lower body dressing/grooming  Alleviating factors: rest  Social/Functional Hx:  Pt lives lives with their spouse   Current DME: brace/splintprefab wrist lacer  Work Status: Employed full time:    Sleep: moderately disturbed  PLOF & Social Hx/Interests: Independent and active without physical limitations  Current level of function: requires min assist with BADLs    Neuro screen: Pt denies c/o, numbness, and tingling    Patient Stated Goals: \"To use my arm again\"    OBJECTIVE     Functional Outcome Measures: Quick Dash  45 score=   77 % functional deficit  Hand/Side Dominance: right handed  Observation/Posture: Holding UE in protected position  Palpation: Tender lateral elbow  Swelling/Edema:  L elbow 29.6 cm  Skin Integrity: incision well healed and no signs of infection     A/PROM Measures:  A/PROM: RIGHT  LEFT PROM  involved side    Shoulder screen WNL       Elbow extension/flexion °  -26/118°  °     Supination/Pronation °  88/90°  °       Wrist Extension/Flexion  59/74°      RD/UD  °          Strength: NA this date due to precautions    Evaluation Modifications/Assistance required : None  Degree of assistance provided: none    Treatment:  Initial Evaluation: Low Complexity (59763)   Therapeutic exercise (12769) x 15 min:  Home Exercise Program development and Education: see below. Patient I with program following instruction and performance  Use of heat and ice as needed for pain and inflammation reduction. Precautions reviewed. OT POC and rationale, education for injury prevention, pain management at lateral elbow and edema management. Access Code: [email or web address]  URL: [email or web address]  Date: 08/16/2022  Prepared by: Luna Huitron    Exercises  Seated Scapular Retraction - 5 x daily - 7 x weekly - 2-3 sets - 15 reps - 3 sec hold  Seated Shoulder Flexion Towel Slide at Table Top - 5 x daily - 7 x weekly - 2-3 sets - 15 reps - 5 sec hold  Isometric Shoulder Flexion at Wall - 2 x daily - 7 x weekly - 1-2 sets - 10 reps - 10 sec hold  Standing Isometric Shoulder Internal Rotation at Doorway - 2 x daily - 7 x weekly - 1-2 sets - 10 reps - 10 sec hold  Isometric Shoulder External Rotation at Wall - 2 x daily - 7 x weekly - 1-2 sets - 10 reps - 10 sec hold  Isometric Shoulder Extension at Wall - 2 x daily - 7 x weekly - 1-2 sets - 10 reps - 10 sec hold  Wrist Circumduction AROM - 5 x daily - 7 x weekly - 2-3 sets - 15 reps - 3 sec hold    CLINICAL DECISION MAKING/ASSESSMENT     Performance Deficits  Physical: Mobility and Strength  Cognitive: No deficiencies noted  Psychosocial: No deficiencies noted  Rehab potential: excellent    The patient presents 2 weeks after onset of lateral elbow pain with impaired lifting, carrying, grasping, pushing, and pulling abilities in the affected upper extremity. Based on these subjective and objective findings, the patient is perceived as currently having a primary functional deficit of  77% dysfunction. After a brief chart/PMH and OT profile review, evaluation requiring minimal  assistance/modifications and simple patient and data analysis, I have determined the patient exhibits several (1-3) performance deficits. Comorbidities do not affect the patient's occupational performance. The following performance deficits (including but not limited to physical, cognitive and/or psychosocial components) result in activity limitations and participation restrictions: Mobility and Strength    The patient would benefit from skilled occupational therapy services to address the deficits noted above for return to prior level of function. PLAN OF CARE     Effective Dates: 8/16/2022 TO 10/15/2022 (60 days).     Frequency/Duration:  1-2x a week  for 60 Day(s)  Interventions may include but are elbow  Pt will improve Quick DASH score indicating a functional deficit of 50% or less. (from 77%)    Long term goals: 10/11/2022  (8 weeks)  Pt will increase elbow, wrist AROM to WNL in order to brandon/doff clothing without difficulty. Pt will demonstrate  strength to 45 psi in order to open tight jars/containers independently. Pt will be able to lift and carry >8 lbs with c/o pain no greater than a 2/10. Pt will improve Quick DASH score indicating a functional deficit of 19% or less  Pt will be I with HEP for ROM and strengthening as indicated at time of discharge.       [employer] Portal     OT Protocols

## 2022-08-16 NOTE — PROGRESS NOTES
Orthopaedic Hand Surgery Note    Name: Janis Denise  Age: 56 y.o. YOB: 1975  Gender: male  MRN: [id number]    Post Operative Visit: Left lateral epicondylitis debridement with tendon reattachement - Left    HPI: Patient is status post Left lateral epicondylitis debridement with tendon reattachement - Left on 8/1/2022. Patient reports some pain on the lateral aspect of the left elbow, he says the radiating pain down the forearm has disappeared, the rash has also improved. Physical Examination:  Well-healed surgical wounds. Sensation is intact in all fingers. Motor exam reveals no deficits. Left elbow motion is 10 to 145 degrees, skin rash has improved significantly, there is still some areas of skin dryness but overall much better. Imaging:     none    Assessment:   1. Left lateral epicondylitis         Status post Left lateral epicondylitis debridement with tendon reattachement - Left on 8/1/2022    Plan:  We discussed the post operative course and progression.   Therapy for tennis elbow postsurgical protocol, I will reassess in 4 weeks, he may resume work with no use of the left hand until I see him in 4 weeks    Janis Walker MD  08/16/22  12:49 PM

## 2022-08-22 ENCOUNTER — OFFICE VISIT (OUTPATIENT)
Dept: ORTHOPEDIC SURGERY | Age: 47
End: 2022-08-22

## 2022-08-22 DIAGNOSIS — M25.522 LEFT ELBOW PAIN: ICD-10-CM

## 2022-08-22 DIAGNOSIS — M62.81 MUSCLE WEAKNESS (GENERALIZED): ICD-10-CM

## 2022-08-22 DIAGNOSIS — M77.12 LEFT LATERAL EPICONDYLITIS: Primary | ICD-10-CM

## 2022-08-22 NOTE — PROGRESS NOTES
[facility] [address] [address]  Dept: [phone]      Occupational Therapy Daily Note     Referring MD: Desiree Contreras MD    Diagnosis:     ICD-10-CM    1. Left lateral epicondylitis  M77.12       2. Muscle weakness (generalized)  M62.81       3. Left elbow pain  M25.522            Surgery: Date 8/1/22     Therapy precautions: Tendon precautions    History of injury/onset : April 2021: fell when leg gave out and he hit a metal container with his elbow  Total Direct Treatment Time:  min                       Total In Office Time: 45 min  Preferred Name:  Moshe Cox     Current Symptoms/Chief complaints:   Chief Complaint   Patient presents with    Elbow Pain       Pain Assessment:  Pain location: L elbow  Average Pain/symptom intensity (0-10 scale)  Last 24 hours: 3/10  Last week (1-7 days): 6/10  Patient reporting consistent improvements with resolution of \"burning\" pain post op, but still sore and aching at the lateral elbow. Some concern expressed. Stiffness at end ROM extension reported. Forgot wrist support one night and had significant soreness in the am at lateral elbow. OBJECTIVE       Elbow extension/flexion °  L: -26/118°  °     Supination/Pronation °  L: 88/90°  °       Wrist Extension/Flexion  L:59/74°      RD/UD  °          Treatment:  Hot Pack (83648) x 15 minutes to L elbow prior to treatment for moist heat/tissue extensibility in preparation for treatment. Therapeutic exercise (64328) x 25 min:  Home Exercise Program development and Education: see below. Reviewed and hold on wrist circumduction due to increased pain with this. Use of heat and ice as needed for pain and inflammation reduction. Precautions reviewed. OT POC and rationale, education for injury prevention, pain management at lateral elbow and edema management. Extensive discussion on pain reduction, injury prevention, rationale for protocol.   PROM by therapist supine to elbow F/E, supination    Access Code: [email or web address]  URL: [email or web address]  Date: 08/16/2022  Prepared by: Deloris Park    Exercises  Seated Scapular Retraction - 5 x daily - 7 x weekly - 2-3 sets - 15 reps - 3 sec hold  Seated Shoulder Flexion Towel Slide at Table Top - 5 x daily - 7 x weekly - 2-3 sets - 15 reps - 5 sec hold  Isometric Shoulder Flexion at Wall - 2 x daily - 7 x weekly - 1-2 sets - 10 reps - 10 sec hold  Standing Isometric Shoulder Internal Rotation at Doorway - 2 x daily - 7 x weekly - 1-2 sets - 10 reps - 10 sec hold  Isometric Shoulder External Rotation at Wall - 2 x daily - 7 x weekly - 1-2 sets - 10 reps - 10 sec hold  Isometric Shoulder Extension at Wall - 2 x daily - 7 x weekly - 1-2 sets - 10 reps - 10 sec hold    Manual Therapy (00276) x 20  minutes: Addressing soft tissue/joint restrictions and swelling of  L elbow:    Joint mobilizations to L elbow while supine , Grade I,II  with distraction/oscillations ant/post, lateral direction prior to ROM for joint lubrication, pain management, ligament and tissue extensibility  Radial head mobilization posteriorly with oscillations, then with supination  Scar mobilization by therapist elbow        ASSESSMENT   Patient with improved pain levels by end session. Some bicep tightness present, felt radial head mobilizations helped with pain as well. Initiated use heat to the elbow/biceps for tissue extensibility, to perform at home PRN. Due to severity of injury and condition of tissue pre op, anticipate a little longer healing time with soreness. No concern at this point regarding injury. PLAN     Continue with OT for ROM and pain management focus initially, HEP review and advancement per protocol.     GOALS     Short Term Goals: 9/13/2022  (4 weeks)  Patient will be I with HEP for strengthening and ROM of the elbow/wrist and hand  Patient will demonstrate AROM WNL to the elbow, wrist and hand  Patient will voice 3 precautions being utilized during daily tasks to prevent injury and strain to the affected elbow  Patient will be I with orthosis brandon/doff, precautions and rationale for use at night time (as needed during the daytime with aggravating activities)  Patient will report pain no greater than 2/10 with morning routine in the affected elbow  Pt will improve Quick DASH score indicating a functional deficit of 50% or less. (from 77%)    Long term goals: 10/11/2022  (8 weeks)  Pt will increase elbow, wrist AROM to WNL in order to brandon/doff clothing without difficulty. Pt will demonstrate  strength to 45 psi in order to open tight jars/containers independently. Pt will be able to lift and carry >8 lbs with c/o pain no greater than a 2/10. Pt will improve Quick DASH score indicating a functional deficit of 19% or less  Pt will be I with HEP for ROM and strengthening as indicated at time of discharge.       [employer] Portal     OT Protocols

## 2022-08-25 ENCOUNTER — OFFICE VISIT (OUTPATIENT)
Dept: ORTHOPEDIC SURGERY | Age: 47
End: 2022-08-25
Payer: COMMERCIAL

## 2022-08-25 DIAGNOSIS — M25.522 LEFT ELBOW PAIN: ICD-10-CM

## 2022-08-25 DIAGNOSIS — M77.12 LATERAL EPICONDYLITIS, LEFT ELBOW: ICD-10-CM

## 2022-08-25 DIAGNOSIS — M62.81 MUSCLE WEAKNESS (GENERALIZED): Primary | ICD-10-CM

## 2022-08-25 PROCEDURE — 97140 MANUAL THERAPY 1/> REGIONS: CPT | Performed by: OCCUPATIONAL THERAPIST

## 2022-08-25 PROCEDURE — 97110 THERAPEUTIC EXERCISES: CPT | Performed by: OCCUPATIONAL THERAPIST

## 2022-08-25 NOTE — PROGRESS NOTES
[facility] [address]  Dept: [phone]      Occupational Therapy Daily Note     Referring MD: Desiree Contreras MD    Diagnosis:     ICD-10-CM    1. Muscle weakness (generalized)  M62.81       2. Left elbow pain  M25.522       3. Lateral epicondylitis, left elbow  M77.12            Surgery: Date 8/1/22     Therapy precautions: Tendon precautions    History of injury/onset : April 2021: fell when leg gave out and he hit a metal container with his elbow  Total Direct Treatment Time:  min                       Total In Office Time: 45 min  Preferred Name:  Moshe Cox     Current Symptoms/Chief complaints:   Chief Complaint   Patient presents with    Elbow Pain       Pain Assessment:  Pain location: L elbow  Average Pain/symptom intensity (0-10 scale)  Last 24 hours: 3/10  Last week (1-7 days): 6/10    Patient reports soreness after last session in the elbow. OBJECTIVE       Elbow extension/flexion °  L: -26/118°  °     Supination/Pronation °  L: 88/90°  °       Wrist Extension/Flexion  L:59/74°      RD/UD  °          Treatment:    Therapeutic exercise (21837) x 25 min:  PROM by therapist supine to elbow F/E, supination  Bolster roll up wedge for elbow ROM F/E    Access Code: [email or web address]  URL: [email or web address]  Date: 08/16/2022  Prepared by: Peter Favre    Exercises  Seated Scapular Retraction - 5 x daily - 7 x weekly - 2-3 sets - 15 reps - 3 sec hold  Seated Shoulder Flexion Towel Slide at Table Top - 5 x daily - 7 x weekly - 2-3 sets - 15 reps - 5 sec hold  Isometric Shoulder Flexion at Wall - 2 x daily - 7 x weekly - 1-2 sets - 10 reps - 10 sec hold  Standing Isometric Shoulder Internal Rotation at Doorway - 2 x daily - 7 x weekly - 1-2 sets - 10 reps - 10 sec hold  Isometric Shoulder External Rotation at Wall - 2 x daily - 7 x weekly - 1-2 sets - 10 reps - 10 sec hold  Isometric Shoulder Extension at Wall - 2 x daily - 7 x weekly - 1-2 sets - 10 reps - 10 sec hold    Manual Therapy (82109) x 20  minutes: Addressing soft tissue/joint restrictions and swelling of  L elbow:    Joint mobilizations to L elbow while supine , Grade I,II  with distraction/oscillations ant/post, lateral direction prior to ROM for joint lubrication, pain management, ligament and tissue extensibility  Posterior mob with belt, elbow flexion at 90, supine  Radial head mobilization posteriorly with oscillations, then with supination  Scar mobilization by therapist elbow        ASSESSMENT   Patient making gains, but still having soreness. No aggressive intervention. PLAN     Continue with OT for ROM and pain management focus initially, HEP review and advancement per protocol. GOALS     Short Term Goals: 9/13/2022  (4 weeks)  Patient will be I with HEP for strengthening and ROM of the elbow/wrist and hand  Patient will demonstrate AROM WNL to the elbow, wrist and hand  Patient will voice 3 precautions being utilized during daily tasks to prevent injury and strain to the affected elbow  Patient will be I with orthosis brandon/doff, precautions and rationale for use at night time (as needed during the daytime with aggravating activities)  Patient will report pain no greater than 2/10 with morning routine in the affected elbow  Pt will improve Quick DASH score indicating a functional deficit of 50% or less. (from 77%)    Long term goals: 10/11/2022  (8 weeks)  Pt will increase elbow, wrist AROM to WNL in order to brandon/doff clothing without difficulty. Pt will demonstrate  strength to 45 psi in order to open tight jars/containers independently. Pt will be able to lift and carry >8 lbs with c/o pain no greater than a 2/10. Pt will improve Quick DASH score indicating a functional deficit of 19% or less  Pt will be I with HEP for ROM and strengthening as indicated at time of discharge.       [employer] Portal     OT Protocols

## 2022-08-29 ENCOUNTER — OFFICE VISIT (OUTPATIENT)
Dept: ORTHOPEDIC SURGERY | Age: 47
End: 2022-08-29

## 2022-08-29 DIAGNOSIS — M62.81 MUSCLE WEAKNESS (GENERALIZED): Primary | ICD-10-CM

## 2022-08-29 DIAGNOSIS — M25.522 LEFT ELBOW PAIN: ICD-10-CM

## 2022-08-29 NOTE — PROGRESS NOTES
[facility] [address] [address]  Dept: [phone]      Occupational Therapy Daily Note     Referring MD: Wilver Philip MD    Diagnosis:     ICD-10-CM    1. Muscle weakness (generalized)  M62.81       2. Left elbow pain  M25.522              Surgery: Date 8/1/22     Therapy precautions: Tendon precautions    History of injury/onset : April 2021: fell when leg gave out and he hit a metal container with his elbow  Total Direct Treatment Time:  45 min                       Total In Office Time: 45 min  Preferred Name:  Moshe Cox     Current Symptoms/Chief complaints:   Chief Complaint   Patient presents with    Elbow Pain     States he is having significantly more pain. Sharp stabbing pain \"in the joint\" for 4-5 minutes. Pain is waking him at night. Burning pain is gone, but joint ache is new since surgery and is persisting. Average Pain/symptom intensity (0-10 scale)  Last 24 hours: 3-4/10;  6.5/10 at night    OBJECTIVE       Elbow extension/flexion °  L: -26/118°  °     Supination/Pronation °  L: 88/90°  °       Wrist Extension/Flexion  L:59/74°      RD/UD  °          Treatment:  Unattended electrical stimulation (54285/) x 15 minutes with moist heat: To L elbow in order to reduce pain and muscle spasms associated with increased activities in therapy as well as help reduce delayed pain episodes. Therapeutic exercise (65430) x 10 min:  Gentle lengthening of extensors after heat  Kineseotaping for inhibition of extensors  Access Code: [email or web address]  URL: [email or web address]  Date: 08/16/2022  Prepared by: Hipolito Rear    Exercises  Seated Scapular Retraction - 5 x daily - 7 x weekly - 2-3 sets - 15 reps - 3 sec hold  Seated Shoulder Flexion Towel Slide at Table Top - 5 x daily - 7 x weekly - 2-3 sets - 15 reps - 5 sec hold  Isometric Shoulder Flexion at Wall - 2 x daily - 7 x weekly - 1-2 sets - 10 reps - 10 sec hold  Standing Isometric Shoulder Internal Rotation at Doorway - 2 x daily - 7 x weekly - 1-2 sets - 10 reps - 10 sec hold  Isometric Shoulder External Rotation at Wall - 2 x daily - 7 x weekly - 1-2 sets - 10 reps - 10 sec hold  Isometric Shoulder Extension at Wall - 2 x daily - 7 x weekly - 1-2 sets - 10 reps - 10 sec hold    Manual Therapy (48374) x 20  minutes: Addressing soft tissue/joint restrictions and swelling of  L elbow:    Joint mobilizations to L elbow while , Grade I,II  with distraction/oscillations ant/post, lateral direction prior to ROM for joint lubrication, pain management, ligament and tissue extensibility  Radial head mobilization posteriorly with oscillations, then with supination  Scar mobilization by therapist elbow  STM to the forearm          ASSESSMENT   Patient making gains, but still having soreness. No aggressive intervention. PLAN     Continue with OT for ROM and pain management focus initially, HEP review and advancement per protocol. GOALS     Short Term Goals: 9/13/2022  (4 weeks)  Patient will be I with HEP for strengthening and ROM of the elbow/wrist and hand  Patient will demonstrate AROM WNL to the elbow, wrist and hand  Patient will voice 3 precautions being utilized during daily tasks to prevent injury and strain to the affected elbow  Patient will be I with orthosis brandon/doff, precautions and rationale for use at night time (as needed during the daytime with aggravating activities)  Patient will report pain no greater than 2/10 with morning routine in the affected elbow  Pt will improve Quick DASH score indicating a functional deficit of 50% or less. (from 77%)    Long term goals: 10/11/2022  (8 weeks)  Pt will increase elbow, wrist AROM to WNL in order to brandon/doff clothing without difficulty. Pt will demonstrate  strength to 45 psi in order to open tight jars/containers independently.    Pt will be able to lift and carry >8 lbs with c/o pain no greater than a 2/10.  Pt will improve Quick DASH score indicating a functional deficit of 19% or less  Pt will be I with HEP for ROM and strengthening as indicated at time of discharge.       [facility] Portal     OT Protocols

## 2022-08-31 ENCOUNTER — OFFICE VISIT (OUTPATIENT)
Dept: ORTHOPEDIC SURGERY | Age: 47
End: 2022-08-31

## 2022-08-31 DIAGNOSIS — M77.12 LATERAL EPICONDYLITIS, LEFT ELBOW: ICD-10-CM

## 2022-08-31 DIAGNOSIS — M62.81 MUSCLE WEAKNESS (GENERALIZED): Primary | ICD-10-CM

## 2022-08-31 DIAGNOSIS — M25.522 LEFT ELBOW PAIN: ICD-10-CM

## 2022-08-31 NOTE — PROGRESS NOTES
sets - 15 reps - 3 sec hold  Seated Shoulder Flexion Towel Slide at Table Top - 5 x daily - 7 x weekly - 2-3 sets - 15 reps - 5 sec hold  Isometric Shoulder Flexion at Wall - 2 x daily - 7 x weekly - 1-2 sets - 10 reps - 10 sec hold  Standing Isometric Shoulder Internal Rotation at Doorway - 2 x daily - 7 x weekly - 1-2 sets - 10 reps - 10 sec hold  Isometric Shoulder External Rotation at Wall - 2 x daily - 7 x weekly - 1-2 sets - 10 reps - 10 sec hold  Isometric Shoulder Extension at Wall - 2 x daily - 7 x weekly - 1-2 sets - 10 reps - 10 sec hold    Manual Therapy (60520) x 25  minutes: Addressing soft tissue/joint restrictions and swelling of  L elbow:    Joint mobilizations to L elbow while , Grade I,II  with distraction/oscillations ant/post, lateral direction prior to ROM for joint lubrication, pain management, ligament and tissue extensibility  Radial head mobilization posteriorly with oscillations, then with supination  Scar mobilization by therapist elbow  STM to the forearm          ASSESSMENT   Discussed soreness with MD, during surgery some arthritis was discovered and explains this soreness present in the elbow. Able to reproduce in clinic with compression to the joint. Dr. Tashia Flores is that if at 3 months post op there is still soreness, intraarticular injection here. Had his first pain free night and was able to sleep through the night last night. No elbow pain present with AROM for first time this date. Some soreness at end range extension, able to obtain much more (less than 10 from 0) without discomfort this date. PLAN     Continue with OT for ROM and pain management focus initially, HEP review and advancement per protocol.     GOALS     Short Term Goals: 9/13/2022  (4 weeks)  Patient will be I with HEP for strengthening and ROM of the elbow/wrist and hand  Patient will demonstrate AROM WNL to the elbow, wrist and hand  Patient will voice 3 precautions being utilized during daily tasks to prevent injury and strain to the affected elbow  Patient will be I with orthosis brandon/doff, precautions and rationale for use at night time (as needed during the daytime with aggravating activities)  Patient will report pain no greater than 2/10 with morning routine in the affected elbow  Pt will improve Quick DASH score indicating a functional deficit of 50% or less. (from 77%)    Long term goals: 10/11/2022  (8 weeks)  Pt will increase elbow, wrist AROM to WNL in order to brandon/doff clothing without difficulty. Pt will demonstrate  strength to 45 psi in order to open tight jars/containers independently. Pt will be able to lift and carry >8 lbs with c/o pain no greater than a 2/10. Pt will improve Quick DASH score indicating a functional deficit of 19% or less  Pt will be I with HEP for ROM and strengthening as indicated at time of discharge.       [facility] Portal     OT Protocols

## 2022-09-08 ENCOUNTER — OFFICE VISIT (OUTPATIENT)
Dept: ORTHOPEDIC SURGERY | Age: 47
End: 2022-09-08

## 2022-09-08 DIAGNOSIS — M62.81 MUSCLE WEAKNESS (GENERALIZED): Primary | ICD-10-CM

## 2022-09-08 DIAGNOSIS — M25.522 LEFT ELBOW PAIN: ICD-10-CM

## 2022-09-08 NOTE — PROGRESS NOTES
[facility]  [address] [place] [address]  Dept: [phone]      Occupational Therapy Progress Report     Referring MD: Steffany Ghosh MD    Diagnosis:     ICD-10-CM    1. Muscle weakness (generalized)  M62.81       2. Left elbow pain  M25.522                Surgery: Date 8/1/22     Therapy precautions: Tendon precautions    History of injury/onset : April 2021: fell when leg gave out and he hit a metal container with his elbow  Total Direct Treatment Time:  50 min                       Total In Office Time: 50 min  [facility]     Current Symptoms/Chief complaints:   Chief Complaint   Patient presents with    Elbow Pain     States still not sleeping entirely still through the night consistently. Average Pain/symptom intensity (0-10 scale)  Last 24 hours: 2/10;  6.5/10 at night    OBJECTIVE               9/8/22  Elbow extension/flexion L: -26/118°  6/130    Supination/Pronation L: 88/90°        Wrist Extension/Flexion 59/74 64/80     RD/UD         Strength  Strength (psi): RIGHT LEFT 9/8/22      Position II (elbow 90) 100 60     Lat Pinch: 27 21     2pt pinch: 14 13     3pt pinch: 23 13        Treatment:    Therapeutic exercise (95339) x 25 min:  Home Exercise Program development and Education: see below. Reviewed eccentric exercises with 1 lb  PROM by therapist supine to elbow F/E, supination; PROM and lengthening of wrist extensors and flexors  UBE L1 9 minutes  Reassessment  Gentle lengthening of extensors by therapist  Access Code: [email or web address]  URL: [email or web address]  Date: 08/16/2022  Prepared by: Orlando Ronquillo    Exercises  Seated Scapular Retraction - 5 x daily - 7 x weekly - 2-3 sets - 15 reps - 3 sec hold  Seated Shoulder Flexion Towel Slide at Table Top - 5 x daily - 7 x weekly - 2-3 sets - 15 reps - 5 sec hold  Isometric Shoulder Flexion at Wall - 2 x daily - 7 x weekly - 1-2 sets - 10 reps - 10 sec hold  Standing Isometric Shoulder Internal Rotation at Doorway - 2 x daily - 7 x weekly - 1-2 sets - 10 reps - 10 sec hold  Isometric Shoulder External Rotation at Wall - 2 x daily - 7 x weekly - 1-2 sets - 10 reps - 10 sec hold  Isometric Shoulder Extension at Wall - 2 x daily - 7 x weekly - 1-2 sets - 10 reps - 10 sec hold    Manual Therapy (43268) x 25  minutes: Addressing soft tissue/joint restrictions and swelling of  L elbow:    Joint mobilizations to L elbow while , Grade I,II  with distraction/oscillations ant/post, lateral direction prior to ROM for joint lubrication, pain management, ligament and tissue extensibility  Radial head mobilization posteriorly with oscillations, then with supination  Scar mobilization by therapist elbow  STM to the forearm      ASSESSMENT   Patient 5 weeks and 3 days post op. Sleep improving with decreasing pain, but has not yet been completely pain free. Pain intensified this date with pinching> measurements. ROM WNL, still discomfort at end range F/E elbow. Slower gains with pain relief. Treatment more conservative for this patient due to arthritis and pain present. PLAN     Continue with OT for ROM and pain management focus initially, HEP review and advancement per protocol. GOALS     Short Term Goals: 9/13/2022  (4 weeks) ALL MET  Patient will be I with HEP for strengthening and ROM of the elbow/wrist and hand  Patient will demonstrate AROM WNL to the elbow, wrist and hand  Patient will voice 3 precautions being utilized during daily tasks to prevent injury and strain to the affected elbow  Patient will be I with orthosis brandon/doff, precautions and rationale for use at night time (as needed during the daytime with aggravating activities)  Patient will report pain no greater than 2/10 with morning routine in the affected elbow  Pt will improve Quick DASH score indicating a functional deficit of 50% or less.  (from 77%)    Long term goals: 10/11/2022  (8 weeks)  Pt will increase elbow, wrist AROM to WNL in order to brandon/doff clothing without difficulty. Pt will demonstrate  strength to 45 psi in order to open tight jars/containers independently. Pt will be able to lift and carry >8 lbs with c/o pain no greater than a 2/10. Pt will improve Quick DASH score indicating a functional deficit of 19% or less  Pt will be I with [facility] for ROM and strengthening as indicated at time of discharge.       [facility] Portal     OT Protocols

## 2022-09-14 ENCOUNTER — OFFICE VISIT (OUTPATIENT)
Dept: ORTHOPEDIC SURGERY | Age: 47
End: 2022-09-14

## 2022-09-14 DIAGNOSIS — M77.12 LATERAL EPICONDYLITIS, LEFT ELBOW: Primary | ICD-10-CM

## 2022-09-14 PROCEDURE — 99024 POSTOP FOLLOW-UP VISIT: CPT | Performed by: ORTHOPAEDIC SURGERY

## 2022-09-14 NOTE — PROGRESS NOTES
Orthopaedic Hand Surgery Note    Name: Christy Sue  Age: 56 y.o. YOB: 1975  Gender: male  MRN: [id number]    Post Operative Visit: Left lateral epicondylitis debridement with tendon reattachement - Left    HPI: Patient is status post Left lateral epicondylitis debridement with tendon reattachement - Left on 8/1/2022. Patient reports he is about 50% better than before surgery, he does not have any more of the radiating sharp pain, he still sore at the lateral epicondyle. Physical Examination:  Well-healed surgical wounds. Sensation is intact in all fingers. Motor exam reveals no deficits. Very minimal tenderness palpation of the surgical site, near full elbow flexion and extension, some tenderness to palpation of the radiocapitellar joint. Imaging:     none    Assessment:   1. Lateral epicondylitis, left elbow         Status post Left lateral epicondylitis debridement with tendon reattachement - Left on 8/1/2022    Plan:  We discussed the post operative course and progression.   Continue therapy, strengthening 8 weeks after surgery, we discussed the progress and expectation with this surgery, MMI should be between 4 and 6 months, at this point the patient may resume work with no lifting, pushing or pulling more than 2 pounds for the next 2 months    Christy Sue MD  09/14/22  3:02 PM

## 2022-09-14 NOTE — LETTER
[facility]  [address]. [facility] [address] [place] [address]  Phone: [phone]  Fax: [phone]    Deric Morris MD        September 14, 2022     Patient: Deric Morris   YOB: 1975   Date of Visit: 9/14/2022       To Whom It May Concern: It is my medical opinion that Katelyn Riley may return to work on Monday September 19th restricted to no lifting, pulling or pushing over 2 pounds with the left arm for 2 months. If you have any questions or concerns, please don't hesitate to call.     Sincerely,        Deric Morris MD

## 2022-09-15 ENCOUNTER — OFFICE VISIT (OUTPATIENT)
Dept: ORTHOPEDIC SURGERY | Age: 47
End: 2022-09-15

## 2022-09-15 DIAGNOSIS — M77.12 LATERAL EPICONDYLITIS, LEFT ELBOW: Primary | ICD-10-CM

## 2022-09-15 DIAGNOSIS — M62.81 MUSCLE WEAKNESS (GENERALIZED): ICD-10-CM

## 2022-09-15 DIAGNOSIS — M25.522 LEFT ELBOW PAIN: ICD-10-CM

## 2022-09-15 NOTE — PROGRESS NOTES
[facility] [address] [address]  Dept: [phone]      Occupational Therapy Daily Note     Referring MD: Tyler Valverde MD    Diagnosis:     ICD-10-CM    1. Lateral epicondylitis, left elbow  M77.12       2. Muscle weakness (generalized)  M62.81       3. Left elbow pain  M25.522                Surgery: Date 8/1/22     Therapy precautions: Tendon precautions    History of injury/onset : April 2021: fell when leg gave out and he hit a metal container with his elbow  Total Direct Treatment Time:  40 min                       Total In Office Time: 40 min  Preferred Name:  [address] [place] [address]     Current Symptoms/Chief complaints:   Chief Complaint   Patient presents with    Elbow Pain     States still not sleeping entirely still through the night consistently. Average Pain/symptom intensity (0-10 scale)  Last 24 hours: 2/10;  5/10 at night  States still having \"joint pain\" in the elbow at night    OBJECTIVE               9/8/22  Elbow extension/flexion L: -26/118°  6/130    Supination/Pronation L: 88/90°        Wrist Extension/Flexion 59/74 64/80     RD/UD         Strength  Strength (psi): RIGHT LEFT 9/8/22      Position II (elbow 90) 100 60     Lat Pinch: 27 21     2pt pinch: 14 13     3pt pinch: 23 13        Treatment:    Therapeutic exercise (31780) x 15 min:  Home Exercise Program development and Education: see below. Reviewed eccentric exercises with 1 lb  PROM by therapist supine to elbow F/E, supination; PROM and lengthening of wrist extensors and flexors  UBE L1 10 minutes  Gentle lengthening of extensors and flexors by therapist  Access Code: [email or web address]  URL: [email or web address]  Date: 08/16/2022  Prepared by: Ashley Vicente    Exercises  Seated Scapular Retraction - 5 x daily - 7 x weekly - 2-3 sets - 15 reps - 3 sec hold  Seated Shoulder Flexion Towel Slide at Table Top - 5 x daily - 7 x weekly - 2-3 sets - 15 reps - 5 sec hold  Isometric Shoulder Flexion at Wall - 2 x daily - 7 x weekly - 1-2 sets - 10 reps - 10 sec hold  Standing Isometric Shoulder Internal Rotation at Doorway - 2 x daily - 7 x weekly - 1-2 sets - 10 reps - 10 sec hold  Isometric Shoulder External Rotation at Wall - 2 x daily - 7 x weekly - 1-2 sets - 10 reps - 10 sec hold  Isometric Shoulder Extension at Wall - 2 x daily - 7 x weekly - 1-2 sets - 10 reps - 10 sec hold    Manual Therapy (18226) x 25  minutes: Addressing soft tissue/joint restrictions and swelling of  L elbow:    Joint mobilizations to L elbow, Grade I,II  with distraction/oscillations ant/post, lateral direction prior to ROM for joint lubrication, pain management, ligament and tissue extensibility  Radial head mobilization posteriorly with oscillations, then with supination  Scar mobilization by therapist elbow  STM to the forearm      ASSESSMENT     Progressing through protocol, still soreness and pain at night. About 50% better per report. Tolerating more activity. PLAN     Continue with OT for ROM, no strengthening until 8 weeks post op per MD.      Alejandra Chino term goals: 10/11/2022  (8 weeks)  Pt will increase elbow, wrist AROM to WNL in order to brandon/doff clothing without difficulty. Pt will demonstrate  strength to 45 psi in order to open tight jars/containers independently. Pt will be able to lift and carry >8 lbs with c/o pain no greater than a 2/10. Pt will improve Quick DASH score indicating a functional deficit of 19% or less  Pt will be I with HEP for ROM and strengthening as indicated at time of discharge.       [facility] Portal     OT Protocols

## 2022-09-22 ENCOUNTER — OFFICE VISIT (OUTPATIENT)
Dept: ORTHOPEDIC SURGERY | Age: 47
End: 2022-09-22

## 2022-09-22 DIAGNOSIS — M77.12 LATERAL EPICONDYLITIS, LEFT ELBOW: Primary | ICD-10-CM

## 2022-09-22 DIAGNOSIS — M62.81 MUSCLE WEAKNESS (GENERALIZED): ICD-10-CM

## 2022-09-22 DIAGNOSIS — M25.522 LEFT ELBOW PAIN: ICD-10-CM

## 2022-09-22 NOTE — PROGRESS NOTES
[facility] [address] [address]  Dept: [phone]      Occupational Therapy Daily Note     Referring MD: Princess Tamayo MD    Diagnosis:     ICD-10-CM    1. Lateral epicondylitis, left elbow  M77.12       2. Muscle weakness (generalized)  M62.81       3. Left elbow pain  M25.522                Surgery: Date 8/1/22     Therapy precautions: Tendon precautions    History of injury/onset : April 2021: fell when leg gave out and he hit a metal container with his elbow  Total Direct Treatment Time:  45 min                       Total In Office Time: 45 min  Preferred Name:  [address] [place] [address]     Current Symptoms/Chief complaints:   Chief Complaint   Patient presents with    Elbow Pain     States still not sleeping entirely still through the night consistently. Average Pain/symptom intensity (0-10 scale)  Last 24 hours: 1-2/10;  3/10 at night  States still having \"joint pain\" in the elbow at night, less    OBJECTIVE               9/8/22  Elbow extension/flexion L: -26/118°  6/130    Supination/Pronation L: 88/90°        Wrist Extension/Flexion 59/74 64/80     RD/UD         Strength  Strength (psi): RIGHT LEFT 9/8/22      Position II (elbow 90) 100 60     Lat Pinch: 27 21     2pt pinch: 14 13     3pt pinch: 23 13        Treatment:    Therapeutic exercise (28863) x 30 min:  Home Exercise Program development and Education: see below. Reviewed eccentric exercises with 1 lb  PROM by therapist supine to elbow F/E, supination; PROM and lengthening of wrist extensors and flexors  UBE L1 10 minutes  Pro/Sup flex bar: red, sets 10  Putty stab flex bar into green    Access Code: [email or web address]  URL: [email or web address]  Date: 08/16/2022  Prepared by: Minh Cespedes    Exercises  Seated Scapular Retraction - 5 x daily - 7 x weekly - 2-3 sets - 15 reps - 3 sec hold  Seated Shoulder Flexion Towel Slide at Table Top - 5 x daily - 7 x weekly - 2-3 sets - 15 reps - 5 sec hold  Isometric Shoulder Flexion at Wall - 2 x daily - 7 x weekly - 1-2 sets - 10 reps - 10 sec hold  Standing Isometric Shoulder Internal Rotation at Doorway - 2 x daily - 7 x weekly - 1-2 sets - 10 reps - 10 sec hold  Isometric Shoulder External Rotation at Wall - 2 x daily - 7 x weekly - 1-2 sets - 10 reps - 10 sec hold  Isometric Shoulder Extension at Wall - 2 x daily - 7 x weekly - 1-2 sets - 10 reps - 10 sec hold    Manual Therapy (70814) x 15  minutes: Addressing soft tissue/joint restrictions and swelling of  L elbow:    Joint mobilizations to L elbow, Grade I,II  with distraction/oscillations ant/post, lateral direction prior to ROM for joint lubrication, pain management, ligament and tissue extensibility  Radial head mobilization posteriorly with oscillations, then with supination  Scar mobilization by therapist elbow/skin rolling  STM to the forearm      ASSESSMENT     Progressing through protocol, still soreness and pain at night. About 60% better per report. Tolerating more activity. PLAN     Continue with OT for ROM, gentle hand strengthening. GOALS       Long term goals: 10/11/2022  (8 weeks)  Pt will increase elbow, wrist AROM to WNL in order to brandon/doff clothing without difficulty. Pt will demonstrate  strength to 45 psi in order to open tight jars/containers independently. Pt will be able to lift and carry >8 lbs with c/o pain no greater than a 2/10. Pt will improve Quick DASH score indicating a functional deficit of 19% or less  Pt will be I with HEP for ROM and strengthening as indicated at time of discharge.       [facility] Portal     OT Protocols

## 2022-09-29 ENCOUNTER — OFFICE VISIT (OUTPATIENT)
Dept: ORTHOPEDIC SURGERY | Age: 47
End: 2022-09-29

## 2022-09-29 DIAGNOSIS — M62.81 MUSCLE WEAKNESS (GENERALIZED): ICD-10-CM

## 2022-09-29 DIAGNOSIS — M77.12 LEFT LATERAL EPICONDYLITIS: ICD-10-CM

## 2022-09-29 DIAGNOSIS — M77.12 LATERAL EPICONDYLITIS, LEFT ELBOW: Primary | ICD-10-CM

## 2022-09-29 DIAGNOSIS — M25.522 LEFT ELBOW PAIN: ICD-10-CM

## 2022-09-29 NOTE — PROGRESS NOTES
[facility] [address] [address]  Dept: [phone]      Occupational Therapy Daily Note     Referring MD: Tyler Valverde MD    Diagnosis:     ICD-10-CM    1. Lateral epicondylitis, left elbow  M77.12       2. Muscle weakness (generalized)  M62.81       3. Left elbow pain  M25.522       4. Left lateral epicondylitis  M77.12         Chief complaint/history of injury:   Date symptoms began: April 2021  Nature of condition:Chronic (continuous duration > 3 months)  Primary cause of current episode: Post-surgical  How did symptoms start: when fell at work  Describe current symptoms: elbow pain and stiffness     Received previous outpatient therapy? Yes, at [facility] prior to surgery with no relief        Pain Assessment:  Pain location: L elbow  Average Pain/symptom intensity (0-10 scale)  Last 24 hours: 5-6/10; without wearing wrist support greater than 3 hours and at night  Otherwise, 1/10 at rest, 3/10 with typing  How often do you feel symptoms? 100% of the time  Description: aching  Aggravating factors: reaching, upper body dressing/grooming, and lower body dressing/grooming  Alleviating factors: rest       Surgery: Date 8/1/22 Left Lateral epicondyle debridement and Tendon reattachment    Therapy precautions: Tendon precautions    History of injury/onset : April 2021: fell when leg gave out and he hit a metal container with his elbow  Total Direct Treatment Time:  45 min                       Total In Office Time: 45 min  Preferred Name:  [address] [place] [address]     Current Symptoms/Chief complaints:   Chief Complaint   Patient presents with    Elbow Pain     States still not sleeping entirely still through the night consistently. States tried time out of orthosis for 2 days and pain has increased again at the original source mid dorsal forearm.       OBJECTIVE     QUICK DASH 9/29/22: 35 = 54.55% functional deficit            9/8/22 9/29/22  Elbow extension/flexion L: -26/118°  6/130 4/137   Supination/Pronation L: 88/90°   90/90     Wrist Extension/Flexion 59/74 64/80 60/73    RD/UD   21/23      Strength  Strength (psi): RIGHT LEFT 9/8/22 LEFT 9/29/22     Position II (elbow 90) 100 60 65    Lat Pinch: 27 21 20    2pt pinch: 14 13 19    3pt pinch: 23 13 16       Treatment:    Therapeutic exercise (06618) x 45 min:  Home Exercise Program development and Education: see below. Reviewed eccentric exercises with 1 lb  UBE L1 10 minutes  Reassessment    Access Code: [email or web address]  URL: [email or web address]  Date: 08/16/2022  Prepared by: Gay Batista    Exercises  Seated Scapular Retraction - 5 x daily - 7 x weekly - 2-3 sets - 15 reps - 3 sec hold  Seated Shoulder Flexion Towel Slide at Table Top - 5 x daily - 7 x weekly - 2-3 sets - 15 reps - 5 sec hold  Isometric Shoulder Flexion at Wall - 2 x daily - 7 x weekly - 1-2 sets - 10 reps - 10 sec hold  Standing Isometric Shoulder Internal Rotation at Doorway - 2 x daily - 7 x weekly - 1-2 sets - 10 reps - 10 sec hold  Isometric Shoulder External Rotation at Wall - 2 x daily - 7 x weekly - 1-2 sets - 10 reps - 10 sec hold  Isometric Shoulder Extension at Wall - 2 x daily - 7 x weekly - 1-2 sets - 10 reps - 10 sec hold        ASSESSMENT       Patient is 8 weeks 3 days post Left Lateral epicondyle debridement and Tendon reattachment. Previous session he reported he felt he was about 60% better per report. He spent 2 days out of orthosis and pain significantly increased in the forearm and he is feeling worse this date. However, with wrist support donned, he states pain 1-3/10 ( 3/10 with typing). States that he has been in wrist support since December/January time. Pain significant with 3 jaw parish pinch test (maximal pinch), caused increased throbbing at the lateral elbow immediately. He has been not lifting more than 2 lbs per lifting restrictions and has HEP for 1 lb wrist strengthening. Had progressed to concentric, but returned to eccentric this date due to increased pain. He will resume concentric once pain free again.  slowly improving, pinch functional.  Still some end range tightness with elbow extension and \"joint pain\" in the elbow with end range passive motion. PLAN     Requesting more authorization to continue with OT for ROM, gentle hand strengthening. GOALS       Long term goals: 10/11/2022  (8 weeks)  Pt will increase elbow, wrist AROM to WNL in order to brandon/doff clothing without difficulty. Pt will demonstrate  strength to 45 psi in order to open tight jars/containers independently. MET 9/29/22  Pt will increase  strength to 80 with pain no greater than 2/10 in the elbow to increase functional use of the distal UE for heavy lifting/pushing/pulling (added 9/29/22)  Pt will be able to lift and carry >8 lbs with c/o pain no greater than a 2/10. Pt will improve Quick DASH score indicating a functional deficit of 19% or less  Pt will be I with HEP for ROM and strengthening as indicated at time of discharge.       [facility] Portal     OT Protocols

## 2022-10-03 ENCOUNTER — TELEPHONE (OUTPATIENT)
Dept: ORTHOPEDIC SURGERY | Age: 47
End: 2022-10-03

## 2022-10-03 NOTE — TELEPHONE ENCOUNTER
----- Message from Conor Miles sent at 10/3/2022  2:14 PM EDT -----  This pt is calling you back. He another question to clarify what you spoke about earlier. Please call him back         I called the patient to let him know NCM was inquiring about frequency of visits and wanted to make sure it was work related as to why he was coming one time a week. He stated it was and also, per protocol, he has not been able to begin strengthening phase yet. Will change frequency as indicated.

## 2022-10-06 ENCOUNTER — OFFICE VISIT (OUTPATIENT)
Dept: ORTHOPEDIC SURGERY | Age: 47
End: 2022-10-06

## 2022-10-06 DIAGNOSIS — M62.81 MUSCLE WEAKNESS (GENERALIZED): Primary | ICD-10-CM

## 2022-10-06 DIAGNOSIS — M25.522 LEFT ELBOW PAIN: ICD-10-CM

## 2022-10-06 NOTE — PROGRESS NOTES
[facility] [address] [address]  Dept: [phone]      Occupational Therapy Daily Note     Referring MD: Katelynn Soni MD    Diagnosis:     ICD-10-CM    1. Muscle weakness (generalized)  M62.81       2. Left elbow pain  M25.522         Chief complaint/history of injury:   Date symptoms began: April 2021  Nature of condition:Chronic (continuous duration > 3 months)  Primary cause of current episode: Post-surgical  How did symptoms start: when fell at work  Describe current symptoms: elbow pain and stiffness     Received previous outpatient therapy? Yes, at [facility] prior to surgery with no relief        Pain Assessment:  Pain location: L elbow  Average Pain/symptom intensity (0-10 scale)  Last 24 hours: 5-6/10; without wearing wrist support greater than 3 hours and at night  Otherwise, 1/10 at rest, 3/10 with typing  How often do you feel symptoms? 100% of the time  Description: aching  Aggravating factors: reaching, upper body dressing/grooming, and lower body dressing/grooming  Alleviating factors: rest       Surgery: Date 8/1/22 Left Lateral epicondyle debridement and Tendon reattachment    Therapy precautions: Tendon precautions    History of injury/onset : April 2021: fell when leg gave out and he hit a metal container with his elbow  Total Direct Treatment Time:  45 min                       Total In Office Time: 45 min  Preferred Name:  [address] [place] [address]     Current Symptoms/Chief complaints:   Chief Complaint   Patient presents with    Elbow Pain     States he was able to walk his dog on the left side. Day after soreness but to a lesser degree. Had to call out of work after  testing last session the next day due to pain keeping him awake all night.       OBJECTIVE     QUICK DASH 9/29/22: 35 = 54.55% functional deficit            9/8/22 9/29/22  Elbow extension/flexion L: -26/118°  6/130 4/137   Supination/Pronation L: 88/90° 90/90     Wrist Extension/Flexion 59/74 64/80 60/73    RD/UD   21/23      Strength  Strength (psi): RIGHT LEFT 9/8/22 LEFT 9/29/22     Position II (elbow 90) 100 60 65    Lat Pinch: 27 21 20    2pt pinch: 14 13 19    3pt pinch: 23 13 16       Treatment:    Therapeutic exercise (09534) x 45 min:  UBE L2 5 minutes  lengthening of extensors and flexors by therapist  Pro/Sup flex bar: red, sets 10  Putty stab cone into yellow, then progressed to green with wrist flex/ext repetitions (wrist neutral rotation)  Gripping yellow putty full fist   Body blade and BOING for forearm strengthening      ASSESSMENT     Deficits with maximal gripping, causing very high levels of pain after  testing last session. Still having some pain over radial tunnel. Tolerating more strengthening this date. PLAN     Requesting more authorization to continue with OT for ROM, gentle hand strengthening. GOALS       Long term goals: 10/11/2022  (8 weeks)  Pt will increase elbow, wrist AROM to WNL in order to brandon/doff clothing without difficulty. Pt will demonstrate  strength to 45 psi in order to open tight jars/containers independently. MET 9/29/22  Pt will increase  strength to 80 with pain no greater than 2/10 in the elbow to increase functional use of the distal UE for heavy lifting/pushing/pulling (added 9/29/22)  Pt will be able to lift and carry >8 lbs with c/o pain no greater than a 2/10. Pt will improve Quick DASH score indicating a functional deficit of 19% or less  Pt will be I with [facility] for ROM and strengthening as indicated at time of discharge. [employer] Portal     OT Protocols  Access Code: [email or web address]  URL: [email or web address]  Date: 08/16/2022  Prepared by: Jesica Steward    Exercises  Seated Scapular Retraction - 5 x daily - 7 x weekly - 2-3 sets - 15 reps - 3 sec hold  Seated Shoulder Flexion Towel Slide at Table Top - 5 x daily - 7 x weekly - 2-3 sets - 15 reps - 5 sec hold  Isometric Shoulder Flexion at Wall - 2 x daily - 7 x weekly - 1-2 sets - 10 reps - 10 sec hold  Standing Isometric Shoulder Internal Rotation at Doorway - 2 x daily - 7 x weekly - 1-2 sets - 10 reps - 10 sec hold  Isometric Shoulder External Rotation at Wall - 2 x daily - 7 x weekly - 1-2 sets - 10 reps - 10 sec hold  Isometric Shoulder Extension at Wall - 2 x daily - 7 x weekly - 1-2 sets - 10 reps - 10 sec hold

## 2022-10-13 ENCOUNTER — OFFICE VISIT (OUTPATIENT)
Dept: ORTHOPEDIC SURGERY | Age: 47
End: 2022-10-13

## 2022-10-13 DIAGNOSIS — M25.522 LEFT ELBOW PAIN: ICD-10-CM

## 2022-10-13 DIAGNOSIS — M62.81 MUSCLE WEAKNESS (GENERALIZED): Primary | ICD-10-CM

## 2022-10-13 NOTE — PROGRESS NOTES
[facility] [address] [address]  Dept: [phone]      Occupational Therapy Daily Note     Referring MD: Brittney Sepulveda MD    Diagnosis:     ICD-10-CM    1. Muscle weakness (generalized)  M62.81       2. 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 However is able to lift his grandson, about 13 lbs. Is able to carry the clip board without pain, this used to aggravate symptoms. OBJECTIVE     QUICK DASH 9/29/22: 35 = 54.55% functional deficit            9/8/22 9/29/22  Elbow extension/flexion L: -26/118°  6/130 4/137   Supination/Pronation L: 88/90°   90/90     Wrist Extension/Flexion 59/74 64/80 60/73    RD/UD   21/23      Strength  Strength (psi): RIGHT LEFT 9/8/22 LEFT 9/29/22     Position II (elbow 90) 100 60 65    Lat Pinch: 27 21 20    2pt pinch: 14 13 19    3pt pinch: 23 13 16       Treatment:    Therapeutic exercise (66266) x30 min:  UBE L2 13 minutes  lengthening of extensors and flexors by therapist  Pro/Sup flex bar: red, sets 10     Gripper and pegs:  2nd prong, 2 boards  Wrist roll up 2 lbs  Manual Therapy (90655) x 15  minutes:  Addressing soft tissue/joint restrictions and swelling of  left elbow:    STM to the palm and wrist area in preparation for PROM and tissue lengthening of the wrist/forearm components  IASTM to lateral elbow with graston tool  Joint mobilizations to left elbow , Grade I,II wrist mobilizations with distraction/oscillations in lateral direction prior to ROM for joint lubrication, pain management, ligament and tissue extensibility      ASSESSMENT     Deficits with maximal gripping, causing pain. Pain with plunging toilet over the weekend. However, is better over all, slow recovery. Tolerating PRE and is motivated. PLAN   continue with OT for ROM, gentle hand strengthening. GOALS       Long term goals: 10/11/2022  (8 weeks)  Pt will increase elbow, wrist AROM to WNL in order to brandon/doff clothing without difficulty. Pt will demonstrate  strength to 45 psi in order to open tight jars/containers independently. MET 9/29/22  Pt will increase  strength to 80 with pain no greater than 2/10 in the elbow to increase functional use of the distal UE for heavy lifting/pushing/pulling (added 9/29/22)  Pt will be able to lift and carry >8 lbs with c/o pain no greater than a 2/10. Pt will improve Quick DASH score indicating a functional deficit of 19% or less  Pt will be I with HEP for ROM and strengthening as indicated at time of discharge. [employer] Portal     OT Protocols  Access Code: [email or web address]  URL: [email or web address]  Date: 08/16/2022  Prepared by: Harry Rubinstein    Exercises  Seated Scapular Retraction - 5 x daily - 7 x weekly - 2-3 sets - 15 reps - 3 sec hold  Seated Shoulder Flexion Towel Slide at Table Top - 5 x daily - 7 x weekly - 2-3 sets - 15 reps - 5 sec hold  Isometric Shoulder Flexion at Wall - 2 x daily - 7 x weekly - 1-2 sets - 10 reps - 10 sec hold  Standing Isometric Shoulder Internal Rotation at Doorway - 2 x daily - 7 x weekly - 1-2 sets - 10 reps - 10 sec hold  Isometric Shoulder External Rotation at Wall - 2 x daily - 7 x weekly - 1-2 sets - 10 reps - 10 sec hold  Isometric Shoulder Extension at Wall - 2 x daily - 7 x weekly - 1-2 sets - 10 reps - 10 sec hold

## 2022-10-28 ENCOUNTER — OFFICE VISIT (OUTPATIENT)
Dept: ORTHOPEDIC SURGERY | Age: 47
End: 2022-10-28

## 2022-10-28 DIAGNOSIS — M62.81 MUSCLE WEAKNESS (GENERALIZED): Primary | ICD-10-CM

## 2022-10-28 DIAGNOSIS — M77.12 LEFT LATERAL EPICONDYLITIS: ICD-10-CM

## 2022-10-28 DIAGNOSIS — M77.12 LATERAL EPICONDYLITIS, LEFT ELBOW: ICD-10-CM

## 2022-10-28 DIAGNOSIS — M25.522 LEFT ELBOW PAIN: ICD-10-CM

## 2022-10-28 NOTE — PROGRESS NOTES
[facility] [address] [address]  Dept: [phone]      Occupational Therapy Daily Note     Referring MD: Catarina Matthews MD    Diagnosis:   No diagnosis found. Surgery: Date 8/1/22 Left Lateral epicondyle debridement and Tendon reattachment    Therapy precautions: Tendon precautions    History of injury/onset : April 2021: fell when leg gave out and he hit a metal container with his elbow  Total Direct Treatment Time:  45 min                       Total In Office Time: 45 min  Preferred Name:  [facility]     Current Symptoms/Chief complaints:   No chief complaint on file. 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 Some increased soreness at 5-6/10. OBJECTIVE     QUICK DASH 9/29/22: 35 = 54.55% functional deficit            9/8/22 9/29/22  Elbow extension/flexion L: -26/118°  6/130 4/137   Supination/Pronation L: 88/90°   90/90     Wrist Extension/Flexion 59/74 64/80 60/73    RD/UD   21/23      Strength  Strength (psi): RIGHT LEFT 9/8/22 LEFT 9/29/22     Position II (elbow 90) 100 60 65    Lat Pinch: 27 21 20    2pt pinch: 14 13 19    3pt pinch: 23 13 16       Treatment:    Therapeutic exercise (50228) x30 min:  UBE L2 6 minutes  lengthening of extensors and flexors by therapist     Gripper and pegs:  2nd prong, 3 boards  Wrist roll up 2 lbs  Pro sup with wrist cizer  Manual Therapy (62494) x 10  minutes: Addressing soft tissue/joint restrictions and swelling of  left elbow:    STM to the forearm following needling, DTM as well, held pressure for release,.  in preparation for PROM and tissue lengthening of the wrist/forearm components    Dry Needling (un-timed code) 83565: needle insertion(s) without injection(s); 1 or 2 muscle(s): Stimulating underlying myofascial trigger points, muscular and connective tissues for the management of neuromusculoskeletal pain and movement impairment Extension at [place] - 2 x daily - 7 x weekly - 1-2 sets - 10 reps - 10 sec hold

## 2022-11-03 ENCOUNTER — OFFICE VISIT (OUTPATIENT)
Dept: ORTHOPEDIC SURGERY | Age: 47
End: 2022-11-03

## 2022-11-03 DIAGNOSIS — M25.522 LEFT ELBOW PAIN: ICD-10-CM

## 2022-11-03 DIAGNOSIS — M77.12 LATERAL EPICONDYLITIS, LEFT ELBOW: ICD-10-CM

## 2022-11-03 DIAGNOSIS — M77.12 LEFT LATERAL EPICONDYLITIS: ICD-10-CM

## 2022-11-03 DIAGNOSIS — M62.81 MUSCLE WEAKNESS (GENERALIZED): Primary | ICD-10-CM

## 2022-11-03 NOTE — PROGRESS NOTES
[facility] [address] [address]  Dept: [phone]      Occupational Therapy Daily Note     Referring MD: Jesus Romo MD    Diagnosis:     ICD-10-CM    1. Muscle weakness (generalized)  M62.81       2. Left elbow pain  M25.522       3. Lateral epicondylitis, left elbow  M77.12       4. 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 Joint soreness greater than muscle. OBJECTIVE     QUICK DASH 9/29/22: 35 = 54.55% functional deficit            9/8/22 9/29/22  Elbow extension/flexion L: -26/118°  6/130 4/137   Supination/Pronation L: 88/90°   90/90     Wrist Extension/Flexion 59/74 64/80 60/73    RD/UD   21/23      Strength  Strength (psi): RIGHT LEFT 9/8/22 LEFT 9/29/22     Position II (elbow 90) 100 60 65    Lat Pinch: 27 21 20    2pt pinch: 14 13 19    3pt pinch: 23 13 16       Treatment:    Therapeutic exercise (52512) x 15 min:  UBE L2 6 minutes  lengthening of extensors and flexors by therapist  Lengthening bicep/tricep by therapist     Manual Therapy (61737) x 30  minutes:  Addressing soft tissue/joint restrictions and swelling of  left elbow:    Joint mobilizations to left elbow , Grade I,II wrist mobilizations with distraction/oscillations in lateral direction, posterior radius and ulna with use belt  and elbow at 90, radial head mobilization with forearm rotated in multiple positions prior to ROM for joint lubrication, pain management, ligament and tissue extensibility      ASSESSMENT     Patient presents with greatly increased pain in the elbow this date due to prolonged fork lift operations. Had relief of muscular pain by end session, joint still sore/painful, but less by end session. PLAN     Continue with OT for dry needling for pain PRN,  manual interventions for pain and joint mobilization,  PRE.  GOALS       Long term goals: 10/11/2022  (8 weeks)  Pt will increase elbow, wrist AROM to WNL in order to brandon/doff clothing without difficulty. Pt will increase  strength to 80 with pain no greater than 2/10 in the elbow to increase functional use of the distal UE for heavy lifting/pushing/pulling (added 9/29/22)  Pt will be able to lift and carry >8 lbs with c/o pain no greater than a 2/10. Pt will improve Quick DASH score indicating a functional deficit of 19% or less  Pt will be I with HEP for ROM and strengthening as indicated at time of discharge. [employer] Portal     OT Protocols  Access Code: [email or web address]  URL: [email or web address]  Date: 08/16/2022  Prepared by: Loly Brar    Exercises  Seated Scapular Retraction - 5 x daily - 7 x weekly - 2-3 sets - 15 reps - 3 sec hold  Seated Shoulder Flexion Towel Slide at Table Top - 5 x daily - 7 x weekly - 2-3 sets - 15 reps - 5 sec hold  Isometric Shoulder Flexion at Wall - 2 x daily - 7 x weekly - 1-2 sets - 10 reps - 10 sec hold  Standing Isometric Shoulder Internal Rotation at Doorway - 2 x daily - 7 x weekly - 1-2 sets - 10 reps - 10 sec hold  Isometric Shoulder External Rotation at Wall - 2 x daily - 7 x weekly - 1-2 sets - 10 reps - 10 sec hold  Isometric Shoulder Extension at Wall - 2 x daily - 7 x weekly - 1-2 sets - 10 reps - 10 sec hold

## 2022-11-10 ENCOUNTER — OFFICE VISIT (OUTPATIENT)
Dept: ORTHOPEDIC SURGERY | Age: 47
End: 2022-11-10

## 2022-11-10 DIAGNOSIS — M62.81 MUSCLE WEAKNESS (GENERALIZED): Primary | ICD-10-CM

## 2022-11-10 DIAGNOSIS — M25.522 LEFT ELBOW PAIN: ICD-10-CM

## 2022-11-10 NOTE — Clinical Note
Pt to see you Tues- refer to assessment and strength please. Will need auth for cont OT, new order. Thanks!

## 2022-11-10 NOTE — PROGRESS NOTES
[facility] [address] [address]  Dept: [phone]      Occupational Therapy Daily Note     Referring MD: Unknown, Provider, MD    Diagnosis:     ICD-10-CM    1. Muscle weakness (generalized)  M62.81       2. Left elbow pain  M25.522               Chief complaint/history of injury:   Date symptoms began: April 2021  Nature of condition:Chronic (continuous duration > 3 months)  Primary cause of current episode: Post-surgical  How did symptoms start: when fell at work  Describe current symptoms: elbow pain and stiffness     Received previous outpatient therapy? Yes, at [facility] prior to surgery with no relief     Pain Assessment:  Pain location: L elbow  Average Pain/symptom intensity (0-10 scale)  Last 24 hours: 4/10  2.5/10 at rest  How often do you feel symptoms? 100% of the time  Description: aching  Aggravating factors: reaching, upper body dressing/grooming, and lower body dressing/grooming  Alleviating factors: rest, compression at the elbow       Surgery: Date 8/1/22 Left Lateral epicondyle debridement and Tendon reattachment    Therapy precautions: Tendon precautions    History of injury/onset : April 2021: fell when leg gave out and he hit a metal container with his elbow  Total Direct Treatment Time:  45 min                       Total In Office Time: 55 min  Preferred Name:  [address] [place] [address]     Current Symptoms/Chief complaints:   Chief Complaint   Patient presents with    Elbow Pain           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 Joint soreness greater than muscle.       OBJECTIVE     QUICK DASH 9/29/22: 35 = 54.55% functional deficit              11/10/22: 29 = 40.91% functional deficit                9/8/22 9/29/22  Elbow extension/flexion L: -26/118°  6/130 4/137   Supination/Pronation L: 88/90°   90/90     Wrist Extension/Flexion 59/74 64/80 60/73    RD/UD   21/23 Strength  Strength (psi): RIGHT LEFT 9/8/22 LEFT 9/29/22 LEFT 11/10/22    Position II (elbow 90) 100 60 65 80   Lat Pinch: 27 21 20 25   2pt pinch: 14 13 19 20   3pt pinch: 23 13 16       17      Treatment:    Therapeutic exercise (26345) x 35 min:  UBE L2 6 minutes  lengthening of extensors and flexors by therapist  Lengthening bicep/tricep by therapist     Manual Therapy (68959) x 10  minutes: Addressing soft tissue/joint restrictions and swelling of  left elbow:    STM to the forearm following needling, DTM as well, held pressure for release,. in preparation for PROM and tissue lengthening of the wrist/forearm components  Dry Needling (un-timed code) 12044: needle insertion(s) without injection(s); 1 or 2 muscle(s): Stimulating underlying myofascial trigger points, muscular and connective tissues for the management of neuromusculoskeletal pain and movement impairment   Patient was educated on dry needling treatment, expectations, and post-treatment instructions. Dry needling precautions/contraindications: Reviewed- none noted    Needles size and location: EDC 40                                             Needle count: 2 needles inserted/ 2 needles removed   Position: Ant/post   Needle  technique left in situ x 6 minutes    Patient Response: Patient experienced no adverse response to treatment. Pre-Test:    Post-Test:        ASSESSMENT     Patient presents 14 weeks and 3 days post Left Lateral epicondyle debridement and Tendon reattachment with improved strength measurements and ROM WNL. Unfortunately, there is still pain present in the elbow. Before surgery, he was having more constant burning and \"tightness\" in the mid dorsal forearm. Currently this pain is much less and less frequent. The second source of pain is at the lateral elbow and in the joint itself. He states it \"feels like it needs to pop. .. but it won't\". He is working full time, various positions: desk work, operating fork lift. With prolonged fork lift operation he had greatly increased pain for a significant amount of time. He is able to lift a case of water, assist the right UE to lift a propane tank (about 65 lbs and about 1/3 normal use) for the first time. Sharp pains no longer wake him at night in the elbow/forearm. He is unsure if he is better than prior to surgery because he has not returned to normal work load with the L UE. Pain has been a limiting factor with progressive resistive exercises in therapy. Once he follows up with Dr. Ramon Tracey next week and has a healthy assessment, recommend more work hardening and strengthening. He will require an updated order for authorization. PLAN     Continue with OT for dry needling for pain PRN,  manual interventions for pain and joint mobilization,  PRE.      GOALS       Long term goals: 10/11/2022  (8 weeks)  Pt will increase elbow, wrist AROM to WNL in order to brandon/doff clothing without difficulty. Pt will increase  strength to 80 with pain no greater than 2/10 in the elbow to increase functional use of the distal UE for heavy lifting/pushing/pulling (added 9/29/22)  Pt will be able to lift and carry >8 lbs with c/o pain no greater than a 2/10. Pt will improve Quick DASH score indicating a functional deficit of 19% or less  Pt will be I with HEP for ROM and strengthening as indicated at time of discharge. [employer] Portal     OT Protocols  Access Code: [email or web address]  URL: [email or web address]  Date: 08/16/2022  Prepared by: Afia Campbell    Exercises  Seated Scapular Retraction - 5 x daily - 7 x weekly - 2-3 sets - 15 reps - 3 sec hold  Seated Shoulder Flexion Towel Slide at Table Top - 5 x daily - 7 x weekly - 2-3 sets - 15 reps - 5 sec hold  Isometric Shoulder Flexion at Wall - 2 x daily - 7 x weekly - 1-2 sets - 10 reps - 10 sec hold  Standing Isometric Shoulder Internal Rotation at Doorway - 2 x daily - 7 x weekly - 1-2 sets - 10 reps - 10 sec hold  Isometric Shoulder External Rotation at Wall - 2 x daily - 7 x weekly - 1-2 sets - 10 reps - 10 sec hold  Isometric Shoulder Extension at Wall - 2 x daily - 7 x weekly - 1-2 sets - 10 reps - 10 sec hold

## 2022-11-15 ENCOUNTER — OFFICE VISIT (OUTPATIENT)
Dept: ORTHOPEDIC SURGERY | Age: 47
End: 2022-11-15

## 2022-11-15 DIAGNOSIS — M77.12 LATERAL EPICONDYLITIS, LEFT ELBOW: Primary | ICD-10-CM

## 2022-11-15 NOTE — PROGRESS NOTES
Orthopaedic Hand Surgery Note    Name: Letty Murhpy  Age: 56 y.o. YOB: 1975  Gender: male  MRN: [id number]    Post Operative Visit: Left lateral epicondylitis debridement with tendon reattachement - Left    HPI: Patient is status post Left lateral epicondylitis debridement with tendon reattachement - Left on 8/1/2022. Patient reports much improvement in pain, at this point his pain on the left elbow is 1 out of 10, he recently had a steroid injection for cervical radiculopathy so this may have something to do with it as well. Physical Examination:  Well-healed surgical wounds. Sensation is intact in all fingers. Motor exam reveals no deficits. No tenderness palpation of the lateral epicondyle today, some discomfort on the extensor supinator mass distal to the lateral epicondyle but this is very mild. Imaging:     none    Assessment:   1. Lateral epicondylitis, left elbow         Status post Left lateral epicondylitis debridement with tendon reattachement - Left on 8/1/2022    Plan:  We discussed the post operative course and progression. Resume work with no lifting, pushing or pulling more than 15 pounds for the next 2 months, I will reassess in 2 months and at that point likely release the patient to work without restrictions otherwise we will obtain a functional capacity evaluation, at that point he should be at [address] [place] [address].   I provided an elbow sleeve for comfort, I discussed the case with a hand therapist to guide his treatment, he needs to strengthen his left arm as tolerated in order to be able to perform all work duties in 2 months    Letty Muprhy MD  11/15/22  3:31 PM

## 2022-11-15 NOTE — LETTER
[address] [facility]  [address] [facility]. [facility] [address] [place] [address]  Phone: [phone]  Fax: [phone]    Rachel Sears MD        November 15, 2022     Patient: Rachel Leyvaton   YOB: 1975   Date of Visit: 11/15/2022       To Whom It May Concern: It is my medical opinion that Katelyn Osvaldo may return to work restricted to no lifting, pushing or pulling over 15 pounds for 2 months. If you have any questions or concerns, please don't hesitate to call.     Sincerely,        Rachel Sears MD

## 2022-11-16 DIAGNOSIS — M77.12 LATERAL EPICONDYLITIS, LEFT ELBOW: Primary | ICD-10-CM

## 2022-11-17 ENCOUNTER — OFFICE VISIT (OUTPATIENT)
Dept: ORTHOPEDIC SURGERY | Age: 47
End: 2022-11-17

## 2022-11-17 DIAGNOSIS — M62.81 MUSCLE WEAKNESS (GENERALIZED): Primary | ICD-10-CM

## 2022-11-17 DIAGNOSIS — M25.522 LEFT ELBOW PAIN: ICD-10-CM

## 2022-11-18 NOTE — PROGRESS NOTES
[facility]  [address] [place] [address]  Dept: [phone]      Occupational Therapy Daily Note     Referring MD: Luis Lui MD    Diagnosis:     ICD-10-CM    1. Muscle weakness (generalized)  M62.81       2. Left elbow pain  M25.522               Chief complaint/history of injury:   Date symptoms began: April 2021  Nature of condition:Chronic (continuous duration > 3 months)  Primary cause of current episode: Post-surgical  How did symptoms start: when fell at work  Describe current symptoms: elbow pain and stiffness     Received previous outpatient therapy? Yes, at [facility] prior to surgery with no relief     Pain Assessment:  Pain location: L elbow  Average Pain/symptom intensity (0-10 scale)  Last 24 hours: 4/10  2.5/10 at rest  How often do you feel symptoms? 100% of the time  Description: aching  Aggravating factors: reaching, upper body dressing/grooming, and lower body dressing/grooming  Alleviating factors: rest, compression at the elbow       Surgery: Date 8/1/22 Left Lateral epicondyle debridement and Tendon reattachment    Therapy precautions: Tendon precautions    History of injury/onset : April 2021: fell when leg gave out and he hit a metal container with his elbow  Total Direct Treatment Time:  45 min                       Total In Office Time: 55 min  Preferred Name:  [address] [place] [address]     Current Symptoms/Chief complaints:   Chief Complaint   Patient presents with    Elbow Pain     MD PALMA PRE.     OBJECTIVE     QUICK DASH 9/29/22: 35 = 54.55% functional deficit              11/10/22: 29 = 40.91% functional deficit            9/8/22 9/29/22  Elbow extension/flexion L: -26/118°  6/130 4/137   Supination/Pronation L: 88/90°   90/90     Wrist Extension/Flexion 59/74 64/80 60/73    RD/UD   21/23      Strength  Strength (psi): RIGHT LEFT 9/8/22 LEFT 9/29/22 LEFT 11/10/22    Position II (elbow 90) 100 60 65 80   Lat Pinch: 27 21 20 25   2pt pinch: 14 13 19 20   3pt pinch: 23 13 16       17      Treatment:    Therapeutic exercise (47574) x 40 min:  UBE L 2.5, 8 minutes  lengthening of extensors and flexors by therapist  Lengthening bicep/tricep by therapist     Gripper and pegs:  4th prong, 2 boards  Wrist roll up 2 lbs  Pro sup with wrist cizer  Biceps all 3 planes:  12 lbs, 2 sets 12        ASSESSMENT     Pt's pain level improved due to cervical injection. Initiated more formal strengthening this date. PLAN     Continue with OT for focus on strengthening and RTW normal activity level. GOALS       Long term goals: 10/11/2022  (8 weeks)  Pt will increase elbow, wrist AROM to WNL in order to brandon/doff clothing without difficulty. Pt will increase  strength to 80 with pain no greater than 2/10 in the elbow to increase functional use of the distal UE for heavy lifting/pushing/pulling (added 9/29/22)  Pt will be able to lift and carry >8 lbs with c/o pain no greater than a 2/10. Pt will improve Quick DASH score indicating a functional deficit of 19% or less  Pt will be I with HEP for ROM and strengthening as indicated at time of discharge. [employer] Portal     OT Protocols  Access Code: [email or web address]  URL: [email or web address]  Date: 08/16/2022  Prepared by: Payton Taylor    Exercises  Seated Scapular Retraction - 5 x daily - 7 x weekly - 2-3 sets - 15 reps - 3 sec hold  Seated Shoulder Flexion Towel Slide at Table Top - 5 x daily - 7 x weekly - 2-3 sets - 15 reps - 5 sec hold  Isometric Shoulder Flexion at Wall - 2 x daily - 7 x weekly - 1-2 sets - 10 reps - 10 sec hold  Standing Isometric Shoulder Internal Rotation at Doorway - 2 x daily - 7 x weekly - 1-2 sets - 10 reps - 10 sec hold  Isometric Shoulder External Rotation at Wall - 2 x daily - 7 x weekly - 1-2 sets - 10 reps - 10 sec hold  Isometric Shoulder Extension at Wall - 2 x daily - 7 x weekly - 1-2 sets - 10 reps - 10 sec hold

## 2022-11-21 NOTE — PROGRESS NOTES
The patient was prescribed and fitted with a Reparel elbow sleeve for the left elbow, size medium. Patient read and signed documenting they understand and agree to [facility]'s current DME return policy.

## 2022-11-30 ENCOUNTER — OFFICE VISIT (OUTPATIENT)
Dept: ORTHOPEDIC SURGERY | Age: 47
End: 2022-11-30

## 2022-11-30 DIAGNOSIS — M77.12 LATERAL EPICONDYLITIS, LEFT ELBOW: ICD-10-CM

## 2022-11-30 DIAGNOSIS — M25.622 STIFFNESS OF LEFT ELBOW JOINT: ICD-10-CM

## 2022-11-30 DIAGNOSIS — R29.898 DECREASED GRIP STRENGTH: ICD-10-CM

## 2022-11-30 DIAGNOSIS — M25.522 LEFT ELBOW PAIN: Primary | ICD-10-CM

## 2022-11-30 DIAGNOSIS — R29.898 UPPER EXTREMITY WEAKNESS: ICD-10-CM

## 2022-12-01 NOTE — PROGRESS NOTES
[facility] [address] [address]  Dept: [phone]      Occupational Therapy Daily Note     Referring MD: Kenton Pisano MD    Diagnosis:     ICD-10-CM    1. Left elbow pain  M25.522       2. Lateral epicondylitis, left elbow  M77.12       3. Decreased  strength  R29.898       4. Upper extremity weakness  R29.898         Chief complaint/history of injury:   Date symptoms began: April 2021  Nature of condition:Chronic (continuous duration > 3 months)  Primary cause of current episode: Post-surgical  How did symptoms start: when fell at work  Describe current symptoms: elbow pain and stiffness     Received previous outpatient therapy? Yes, at [facility] prior to surgery with no relief     Pain Assessment:  Pain location: L elbow  Average Pain/symptom intensity (0-10 scale)  Last 24 hours: 4/10  2.5/10 at rest  How often do you feel symptoms? 100% of the time  Description: aching  Aggravating factors: reaching, upper body dressing/grooming, and lower body dressing/grooming  Alleviating factors: rest, compression at the elbow       Surgery: Date 8/1/22 Left Lateral epicondyle debridement and Tendon reattachment    Therapy precautions: Tendon precautions    History of injury/onset : April 2021: fell when leg gave out and he hit a metal container with his elbow  Total Direct Treatment Time:  45 min                       Total In Office Time: 50 min  [facility]     Current Symptoms/Chief complaints:   Chief Complaint   Patient presents with    Elbow Pain     States still having elbow joint pain.     OBJECTIVE     QUICK DASH 9/29/22: 35 = 54.55% functional deficit              11/10/22: 29 = 40.91% functional deficit            9/8/22 9/29/22  Elbow extension/flexion L: -26/118°  6/130 4/137   Supination/Pronation L: 88/90°   90/90     Wrist Extension/Flexion 59/74 64/80 60/73    RD/UD   21/23      Strength  Strength (psi): RIGHT LEFT 9/8/22 LEFT 9/29/22 LEFT 11/10/22 LEFT 12/2/22    Position II (elbow 90) 100 60 65 80 90   Lat Pinch: 27 21 20 25 23   2pt pinch: 14 13 19 20 18   3pt pinch: 23 13 16       17 15      Treatment:  Therapeutic exercise (23255) x 45 min:  UBE L 2.5, 5 minutes     Gripper and pegs:  4th prong, 2 boards  Wrist roll up 2 lbs  Pro sup with green flex bar  Biceps all 3 planes:  12 lbs, 3 sets 12        ASSESSMENT     Pt's pain level improved due to cervical injection. Still having joint pain in the elbow. Trouble with  and push combined with elbow ROM (ie vacuuming car) and painful. Discussed treatment plan: continue with strengthening or discharge to [facility]. He will discuss with therapist next week. PLAN     Continue with OT for focus on strengthening, PRE/UE endurance and RTW normal activity level. GOALS       Long term goals: 10/11/2022  (8 weeks)  Pt will increase elbow, wrist AROM to WNL in order to brandon/doff clothing without difficulty. Pt will increase  strength to 80 with pain no greater than 2/10 in the elbow to increase functional use of the distal UE for heavy lifting/pushing/pulling (added 9/29/22)  Pt will be able to lift and carry >8 lbs with c/o pain no greater than a 2/10. Pt will improve Quick DASH score indicating a functional deficit of 19% or less  Pt will be I with [facility] for ROM and strengthening as indicated at time of discharge. [employer] Portal     OT Protocols  Access Code: [email or web address]  URL: [email or web address]  Date: 08/16/2022  Prepared by: Tony Chapman    Exercises  Seated Scapular Retraction - 5 x daily - 7 x weekly - 2-3 sets - 15 reps - 3 sec hold  Seated Shoulder Flexion Towel Slide at Table Top - 5 x daily - 7 x weekly - 2-3 sets - 15 reps - 5 sec hold  Isometric Shoulder Flexion at Wall - 2 x daily - 7 x weekly - 1-2 sets - 10 reps - 10 sec hold  Standing Isometric Shoulder Internal Rotation at Doorway - 2 x daily - 7 x weekly - 1-2 sets - 10 reps - 10 sec hold  Isometric Shoulder External Rotation at Wall - 2 x daily - 7 x weekly - 1-2 sets - 10 reps - 10 sec hold  Isometric Shoulder Extension at Wall - 2 x daily - 7 x weekly - 1-2 sets - 10 reps - 10 sec hold

## 2022-12-02 ENCOUNTER — OFFICE VISIT (OUTPATIENT)
Dept: ORTHOPEDIC SURGERY | Age: 47
End: 2022-12-02

## 2022-12-02 DIAGNOSIS — R29.898 DECREASED GRIP STRENGTH: ICD-10-CM

## 2022-12-02 DIAGNOSIS — R29.898 UPPER EXTREMITY WEAKNESS: ICD-10-CM

## 2022-12-02 DIAGNOSIS — M25.522 LEFT ELBOW PAIN: Primary | ICD-10-CM

## 2022-12-02 DIAGNOSIS — M77.12 LATERAL EPICONDYLITIS, LEFT ELBOW: ICD-10-CM

## 2022-12-06 ENCOUNTER — OFFICE VISIT (OUTPATIENT)
Dept: ORTHOPEDIC SURGERY | Age: 47
End: 2022-12-06
Payer: COMMERCIAL

## 2022-12-06 DIAGNOSIS — M77.12 LATERAL EPICONDYLITIS, LEFT ELBOW: ICD-10-CM

## 2022-12-06 DIAGNOSIS — M25.622 STIFFNESS OF LEFT ELBOW JOINT: ICD-10-CM

## 2022-12-06 DIAGNOSIS — R29.898 DECREASED GRIP STRENGTH: ICD-10-CM

## 2022-12-06 DIAGNOSIS — R29.898 UPPER EXTREMITY WEAKNESS: ICD-10-CM

## 2022-12-06 DIAGNOSIS — M25.522 LEFT ELBOW PAIN: Primary | ICD-10-CM

## 2022-12-06 PROCEDURE — 97110 THERAPEUTIC EXERCISES: CPT | Performed by: OCCUPATIONAL THERAPIST

## 2022-12-06 PROCEDURE — 97140 MANUAL THERAPY 1/> REGIONS: CPT | Performed by: OCCUPATIONAL THERAPIST

## 2022-12-06 NOTE — PROGRESS NOTES
[facility] [address] [address]  Dept: [phone]      Occupational Therapy Daily Note     Referring MD: Hilary Harris MD    Diagnosis:     ICD-10-CM    1. Left elbow pain  M25.522       2. Lateral epicondylitis, left elbow  M77.12       3. Decreased  strength  R29.898       4. Upper extremity weakness  R29.898         Chief complaint/history of injury:   Date symptoms began: April 2021  Nature of condition:Chronic (continuous duration > 3 months)  Primary cause of current episode: Post-surgical  How did symptoms start: when fell at work  Describe current symptoms: elbow pain and stiffness     Received previous outpatient therapy? Yes, at [facility] prior to surgery with no relief     Pain Assessment:  Pain location: L elbow  Average Pain/symptom intensity (0-10 scale)  Last 24 hours: 4/10  2.5/10 at rest  How often do you feel symptoms? 100% of the time  Description: aching  Aggravating factors: reaching, upper body dressing/grooming, and lower body dressing/grooming  Alleviating factors: rest, compression at the elbow       Surgery: Date 8/1/22 Left Lateral epicondyle debridement and Tendon reattachment    Therapy precautions: Tendon precautions    History of injury/onset : April 2021: fell when leg gave out and he hit a metal container with his elbow  Total Direct Treatment Time:  45 min                       Total In Office Time: 50 min  Preferred Name:  [facility]     Current Symptoms/Chief complaints:   Chief Complaint   Patient presents with    Elbow Pain     States still having elbow joint pain.       OBJECTIVE     QUICK DASH 9/29/22: 35 = 54.55% functional deficit              11/10/22: 29 = 40.91% functional deficit            9/8/22 9/29/22  Elbow extension/flexion L: -26/118°  6/130 4/137   Supination/Pronation L: 88/90°   90/90     Wrist Extension/Flexion 59/74 64/80 60/73    RD/UD   21/23      Strength  Strength (psi): RIGHT LEFT 9/8/22 LEFT 9/29/22 LEFT 11/10/22 LEFT 12/2/22    Position II (elbow 90) 100 60 65 80 90   Lat Pinch: 27 21 20 25 23   2pt pinch: 14 13 19 20 18   3pt pinch: 23 13 16       17 15      Treatment:  Therapeutic exercise (91736) x 45 min:  UBE L 2.5, 5 minutes     Gripper and pegs:  4th prong, 2 boards  Incorporated self mobilization to radial head, self VM glide into HEP/handout provided to dec pain/increase joint lubrication/inc. Tissue ext  Wrist roll up 4.5  lbs  Pro sup with green flex bar  Biceps all 3 planes:  12 lbs, 3 sets 12    Manual Therapy (95288) x 10  minutes: Addressing soft tissue/joint restrictions and swelling of  left elbow:    IASTM to lateral elbow with graston tool  Radial head distraction/distal glide, with humerus supportedproximal, radius distraction distally to decrease pain, increase tissue ext      ASSESSMENT     Pt continues to report some pain level improved due to previous cervical injection, but reports elbow joint pain approximately the same. Trouble with  and push combined with elbow ROM (ie vacuuming car) and painful. Discussed treatment plan: continue with strengthening or discharge to [facility]. Pt  will discuss with therapist later this week. PLAN     Continue with OT for focus on strengthening, PRE/UE endurance and RTW normal activity level. GOALS       Long term goals: 10/11/2022  (8 weeks)  Pt will increase elbow, wrist AROM to WNL in order to brandon/doff clothing without difficulty. Pt will increase  strength to 80 with pain no greater than 2/10 in the elbow to increase functional use of the distal UE for heavy lifting/pushing/pulling (added 9/29/22)  Pt will be able to lift and carry >8 lbs with c/o pain no greater than a 2/10. Pt will improve Quick DASH score indicating a functional deficit of 19% or less  Pt will be I with [facility] for ROM and strengthening as indicated at time of discharge.       [employer] Portal     OT Protocols  Access Code: [email or web address]  URL: [email or web address]  Date: 08/16/2022  Prepared by: Irais Cardenas  Seated Scapular Retraction - 5 x daily - 7 x weekly - 2-3 sets - 15 reps - 3 sec hold  Seated Shoulder Flexion Towel Slide at Table Top - 5 x daily - 7 x weekly - 2-3 sets - 15 reps - 5 sec hold  Isometric Shoulder Flexion at Wall - 2 x daily - 7 x weekly - 1-2 sets - 10 reps - 10 sec hold  Standing Isometric Shoulder Internal Rotation at Doorway - 2 x daily - 7 x weekly - 1-2 sets - 10 reps - 10 sec hold  Isometric Shoulder External Rotation at Wall - 2 x daily - 7 x weekly - 1-2 sets - 10 reps - 10 sec hold  Isometric Shoulder Extension at Wall - 2 x daily - 7 x weekly - 1-2 sets - 10 reps - 10 sec hold

## 2022-12-09 ENCOUNTER — OFFICE VISIT (OUTPATIENT)
Dept: ORTHOPEDIC SURGERY | Age: 47
End: 2022-12-09

## 2022-12-09 DIAGNOSIS — R29.898 UPPER EXTREMITY WEAKNESS: ICD-10-CM

## 2022-12-09 DIAGNOSIS — M25.522 LEFT ELBOW PAIN: Primary | ICD-10-CM

## 2022-12-09 DIAGNOSIS — R29.898 DECREASED GRIP STRENGTH: ICD-10-CM

## 2022-12-09 NOTE — PROGRESS NOTES
[facility] [address] [address]  Dept: [phone]      Occupational Therapy Daily Note     Referring MD: Aishwarya Baldwin MD    Diagnosis:     ICD-10-CM    1. Left elbow pain  M25.522       2. Lateral epicondylitis, left elbow  M77.12       3. Decreased  strength  R29.898       4. Upper extremity weakness  R29.898         Chief complaint/history of injury:   Date symptoms began: April 2021  Nature of condition:Chronic (continuous duration > 3 months)  Primary cause of current episode: Post-surgical  How did symptoms start: when fell at work  Describe current symptoms: elbow pain and stiffness     Received previous outpatient therapy? Yes, at [facility] prior to surgery with no relief     Pain Assessment:  Pain location: L elbow  Average Pain/symptom intensity (0-10 scale)  Last 24 hours: 4/10  2.5/10 at rest  How often do you feel symptoms? 100% of the time  Description: aching  Aggravating factors: reaching, upper body dressing/grooming, and lower body dressing/grooming  Alleviating factors: rest, compression at the elbow       Surgery: Date 8/1/22 Left Lateral epicondyle debridement and Tendon reattachment    Therapy precautions: Tendon precautions    History of injury/onset : April 2021: fell when leg gave out and he hit a metal container with his elbow  Total Direct Treatment Time:  45 min                       Total In Office Time: 50 min  [facility]     Current Symptoms/Chief complaints:   Chief Complaint   Patient presents with    Elbow Pain     States HE HAS NOT YET ATTEMPTED FULL, NORMAL USE LUE.     OBJECTIVE     QUICK DASH 9/29/22: 35 = 54.55% functional deficit              11/10/22: 29 = 40.91% functional deficit            9/8/22 9/29/22  Elbow extension/flexion L: -26/118°  6/130 4/137   Supination/Pronation L: 88/90°   90/90     Wrist Extension/Flexion 59/74 64/80 60/73    RD/UD   21/23 Strength  Strength (psi): RIGHT LEFT 9/8/22 LEFT 9/29/22 LEFT 11/10/22 LEFT 12/2/22    Position II (elbow 90) 100 60 65 80 90   Lat Pinch: 27 21 20 25 23   2pt pinch: 14 13 19 20 18   3pt pinch: 23 13 16       17 15      Treatment:  Therapeutic exercise (08499) x 45 min:  UBE L 2.5, 5 minutes     Gripper and pegs:  4th prong, 2 boards  Wrist roll up 4.5  lbs  Pro sup with green flex bar  Biceps all 3 planes:  12 lbs, 3 sets 12  Triceps x30: blue cord        ASSESSMENT   Tolerating PRE well. Desires to continue with conditioning and strengthening to maximize function and strength of the L UE    PLAN     Continue with OT for focus on strengthening, PRE/UE endurance and RTW normal activity level. GOALS       Long term goals: 1/11/2022  (8 weeks)  Pt will increase elbow, wrist AROM to WNL in order to brandon/doff clothing without difficulty. MET  Pt will increase  strength to 80 with pain no greater than 2/10 in the elbow to increase functional use of the distal UE for heavy lifting/pushing/pulling (added 9/29/22) MET  Pt will be able to lift and carry >25 lbs with c/o pain no greater than a 2/10. (Upgraded weight)  Pt will improve Quick DASH score indicating a functional deficit of 19% or less  Pt will be I with HEP for ROM and strengthening as indicated at time of discharge. 20:20 Mobile Portal     OT Protocols  Access Code: [email or web address]  URL: [email or web address]  Date: 08/16/2022  Prepared by: Malcom Knott    Exercises  Seated Scapular Retraction - 5 x daily - 7 x weekly - 2-3 sets - 15 reps - 3 sec hold  Seated Shoulder Flexion Towel Slide at Table Top - 5 x daily - 7 x weekly - 2-3 sets - 15 reps - 5 sec hold  Isometric Shoulder Flexion at Wall - 2 x daily - 7 x weekly - 1-2 sets - 10 reps - 10 sec hold  Standing Isometric Shoulder Internal Rotation at Doorway - 2 x daily - 7 x weekly - 1-2 sets - 10 reps - 10 sec hold  Isometric Shoulder External Rotation at Wall - 2 x daily - 7 x weekly - 1-2 sets - 10 reps - 10 sec hold  Isometric Shoulder Extension at Wall - 2 x daily - 7 x weekly - 1-2 sets - 10 reps - 10 sec hold

## 2022-12-14 ENCOUNTER — OFFICE VISIT (OUTPATIENT)
Dept: ORTHOPEDIC SURGERY | Age: 47
End: 2022-12-14

## 2022-12-14 DIAGNOSIS — R29.898 DECREASED GRIP STRENGTH: ICD-10-CM

## 2022-12-14 DIAGNOSIS — R29.898 UPPER EXTREMITY WEAKNESS: ICD-10-CM

## 2022-12-14 DIAGNOSIS — M25.522 LEFT ELBOW PAIN: Primary | ICD-10-CM

## 2022-12-14 DIAGNOSIS — M25.622 STIFFNESS OF LEFT ELBOW JOINT: ICD-10-CM

## 2022-12-14 NOTE — PROGRESS NOTES
[facility] [address] [address]  Dept: [phone]      Occupational Therapy Daily Note     Referring MD: Scott Childs MD    Diagnosis:     ICD-10-CM    1. Left elbow pain  M25.522       2. Lateral epicondylitis, left elbow  M77.12       3. Decreased  strength  R29.898       4. Upper extremity weakness  R29.898         Chief complaint/history of injury:   Date symptoms began: April 2021  Nature of condition:Chronic (continuous duration > 3 months)  Primary cause of current episode: Post-surgical  How did symptoms start: when fell at work  Describe current symptoms: elbow pain and stiffness     Received previous outpatient therapy? Yes, at [facility] prior to surgery with no relief     Pain Assessment:  Pain location: L elbow  Average Pain/symptom intensity (0-10 scale)  Last 24 hours: 4/10  2.5/10 at rest  How often do you feel symptoms? 100% of the time  Description: aching  Aggravating factors: reaching, upper body dressing/grooming, and lower body dressing/grooming  Alleviating factors: rest, compression at the elbow       Surgery: Date 8/1/22 Left Lateral epicondyle debridement and Tendon reattachment    Therapy precautions: Tendon precautions    History of injury/onset : April 2021: fell when leg gave out and he hit a metal container with his elbow  Total Direct Treatment Time:  45 min                       Total In Office Time: 50 min  Preferred Name:  [facility]      Current Symptoms/Chief complaints:   Chief Complaint   Patient presents with    Elbow Pain     States still hasn't started use of unrestricted UE use. Is dealing with depression, new onset.     OBJECTIVE     QUICK DASH 9/29/22: 35 = 54.55% functional deficit              11/10/22: 29 = 40.91% functional deficit            9/8/22 9/29/22  Elbow extension/flexion L: -26/118°  6/130 4/137   Supination/Pronation L: 88/90°   90/90     Wrist Extension/Flexion 59/74 64/80 60/73    RD/UD   21/23      Strength  Strength (psi): RIGHT LEFT 9/8/22 LEFT 9/29/22 LEFT 11/10/22 LEFT 12/2/22    Position II (elbow 90) 100 60 65 80 90   Lat Pinch: 27 21 20 25 23   2pt pinch: 14 13 19 20 18   3pt pinch: 23 13 16       17 15      Treatment:  Therapeutic exercise (95687) x 45 min:  UBE L 3, 8 minutes  Gripping green putty full fist   Full weightbear into green putty  Gripper and pegs:  4th prong, 2 boards  Wrist roll up 4.5  lbs  Pro sup with green flex bar  Biceps : supinated only due to discomfort with neutral and pronated at lateral head  12 lbs, 3 sets 12  Triceps x 60: 30 lbs      ASSESSMENT   Trouble with gripping green putty: increased pain and soreness at lateral elbow. Desires to continue with conditioning and strengthening to maximize function and strength of the L UE    PLAN     Continue with OT for focus on strengthening, PRE/UE endurance and RTW normal activity level. GOALS       Long term goals: 1/11/2022  (8 weeks)  Pt will increase elbow, wrist AROM to WNL in order to brandon/doff clothing without difficulty. MET  Pt will increase  strength to 80 with pain no greater than 2/10 in the elbow to increase functional use of the distal UE for heavy lifting/pushing/pulling (added 9/29/22) MET  Pt will be able to lift and carry >25 lbs with c/o pain no greater than a 2/10. (Upgraded weight)  Pt will improve Quick DASH score indicating a functional deficit of 19% or less  Pt will be I with HEP for ROM and strengthening as indicated at time of discharge. [employer]     OT Protocols  Access Code: [email or web address]  URL: [email or web address]  Date: 08/16/2022  Prepared by: Edy Cantor    Exercises  Seated Scapular Retraction - 5 x daily - 7 x weekly - 2-3 sets - 15 reps - 3 sec hold  Seated Shoulder Flexion Towel Slide at Table Top - 5 x daily - 7 x weekly - 2-3 sets - 15 reps - 5 sec hold  Isometric Shoulder Flexion at Wall - 2 x daily - 7 x weekly - 1-2 sets - 10 reps - 10 sec hold  Standing Isometric Shoulder Internal Rotation at Doorway - 2 x daily - 7 x weekly - 1-2 sets - 10 reps - 10 sec hold  Isometric Shoulder External Rotation at Wall - 2 x daily - 7 x weekly - 1-2 sets - 10 reps - 10 sec hold  Isometric Shoulder Extension at Wall - 2 x daily - 7 x weekly - 1-2 sets - 10 reps - 10 sec hold

## 2022-12-22 ENCOUNTER — OFFICE VISIT (OUTPATIENT)
Dept: ORTHOPEDIC SURGERY | Age: 47
End: 2022-12-22

## 2022-12-22 DIAGNOSIS — M25.622 STIFFNESS OF LEFT ELBOW JOINT: ICD-10-CM

## 2022-12-22 DIAGNOSIS — M62.81 MUSCLE WEAKNESS (GENERALIZED): ICD-10-CM

## 2022-12-22 DIAGNOSIS — R29.898 UPPER EXTREMITY WEAKNESS: ICD-10-CM

## 2022-12-22 DIAGNOSIS — M25.522 LEFT ELBOW PAIN: Primary | ICD-10-CM

## 2022-12-22 DIAGNOSIS — R29.898 DECREASED GRIP STRENGTH: ICD-10-CM

## 2022-12-22 NOTE — PROGRESS NOTES
[facility] [address] [address]  Dept: [phone]      Occupational Therapy Daily Note     Referring MD: Sarthak Pimentel MD    Diagnosis:     ICD-10-CM    1. Left elbow pain  M25.522       2. Lateral epicondylitis, left elbow  M77.12       3. Decreased  strength  R29.898       4. Upper extremity weakness  R29.898         Chief complaint/history of injury:   Date symptoms began: April 2021  Nature of condition:Chronic (continuous duration > 3 months)  Primary cause of current episode: Post-surgical  How did symptoms start: when fell at work  Describe current symptoms: elbow pain and stiffness     Received previous outpatient therapy? Yes, at [facility] prior to surgery with no relief     Pain Assessment:  Pain location: L elbow  Average Pain/symptom intensity (0-10 scale)  Last 24 hours: 4/10  2.5/10 at rest  How often do you feel symptoms? 100% of the time  Description: aching  Aggravating factors: reaching, upper body dressing/grooming, and lower body dressing/grooming  Alleviating factors: rest, compression at the elbow       Surgery: Date 8/1/22 Left Lateral epicondyle debridement and Tendon reattachment    Therapy precautions: Tendon precautions    History of injury/onset : April 2021: fell when leg gave out and he hit a metal container with his elbow  Total Direct Treatment Time:  45 min                       Total In Office Time: 60 min  Preferred Name:  [facility]     Current Symptoms/Chief complaints:   Chief Complaint   Patient presents with    Elbow Pain     States increased lateral elbow pain with elbow extension and reaching this date.     OBJECTIVE     QUICK DASH 9/29/22: 35 = 54.55% functional deficit              11/10/22: 29 = 40.91% functional deficit            9/8/22 9/29/22  Elbow extension/flexion L: -26/118°  6/130 4/137   Supination/Pronation L: 88/90°   90/90     Wrist Extension/Flexion 59/74 64/80 60/73 RD/UD   21/23      Strength  Strength (psi): RIGHT LEFT 9/8/22 LEFT 9/29/22 LEFT 11/10/22 LEFT 12/2/22    Position II (elbow 90) 100 60 65 80 90   Lat Pinch: 27 21 20 25 23   2pt pinch: 14 13 19 20 18   3pt pinch: 23 13 16       17 15      Treatment:  Therapeutic exercise (92949) x 45 min:  UBE L 3, 8 minutes  Gripping green putty full fist   Jar/container opening simulation into green putty  Full weightbear into green putty  Biceps all 3 planes: supinated only due to discomfort with neutral and pronated at lateral head  12 lbs, 3 sets 12  Triceps x 60: 30 lbs    Unattended electrical stimulation (99237/) x 15 minutes with moist heat: To L elbow in order to reduce pain and muscle spasms associated with increased activities in therapy as well as help reduce delayed pain episodes. ASSESSMENT     Some increased lateral elbow pain this date with cold/damp weather. Could not tolerate as much activity this date. PLAN     Continue with OT for focus on strengthening, PRE/UE endurance and RTW normal activity level. GOALS       Long term goals: 1/11/2022  (8 weeks)  Pt will increase elbow, wrist AROM to WNL in order to brandon/doff clothing without difficulty. MET  Pt will increase  strength to 80 with pain no greater than 2/10 in the elbow to increase functional use of the distal UE for heavy lifting/pushing/pulling (added 9/29/22) MET  Pt will be able to lift and carry >25 lbs with c/o pain no greater than a 2/10. (Upgraded weight)  Pt will improve Quick DASH score indicating a functional deficit of 19% or less  Pt will be I with HEP for ROM and strengthening as indicated at time of discharge. [email or web address] Portal     OT Protocols  Access Code: [email or web address]  URL: [email or web address]/  Date: 08/16/2022  Prepared by: Braeden Yu    Exercises  Seated Scapular Retraction - 5 x daily - 7 x weekly - 2-3 sets - 15 reps - 3 sec hold  Seated Shoulder Flexion Towel Slide at Table Top - 5 x daily - 7 x weekly - 2-3 sets - 15 reps - 5 sec hold  Isometric Shoulder Flexion at Wall - 2 x daily - 7 x weekly - 1-2 sets - 10 reps - 10 sec hold  Standing Isometric Shoulder Internal Rotation at Doorway - 2 x daily - 7 x weekly - 1-2 sets - 10 reps - 10 sec hold  Isometric Shoulder External Rotation at Wall - 2 x daily - 7 x weekly - 1-2 sets - 10 reps - 10 sec hold  Isometric Shoulder Extension at Wall - 2 x daily - 7 x weekly - 1-2 sets - 10 reps - 10 sec hold

## 2022-12-28 ENCOUNTER — OFFICE VISIT (OUTPATIENT)
Dept: ORTHOPEDIC SURGERY | Age: 47
End: 2022-12-28

## 2022-12-28 DIAGNOSIS — M25.522 LEFT ELBOW PAIN: Primary | ICD-10-CM

## 2022-12-28 DIAGNOSIS — R29.898 UPPER EXTREMITY WEAKNESS: ICD-10-CM

## 2022-12-28 DIAGNOSIS — R29.898 DECREASED GRIP STRENGTH: ICD-10-CM

## 2022-12-28 NOTE — PROGRESS NOTES
[facility] [address] [address]  Dept: [phone]      Occupational Therapy Daily Note     Referring MD: Yoni Cardenas MD    Diagnosis:     ICD-10-CM    1. Left elbow pain  M25.522       2. Lateral epicondylitis, left elbow  M77.12       3. Decreased  strength  R29.898       4. Upper extremity weakness  R29.898         Chief complaint/history of injury:   Date symptoms began: April 2021  Nature of condition:Chronic (continuous duration > 3 months)  Primary cause of current episode: Post-surgical  How did symptoms start: when fell at work  Describe current symptoms: elbow pain and stiffness     Received previous outpatient therapy? Yes, at [facility] prior to surgery with no relief     Pain Assessment:  Pain location: L elbow  Average Pain/symptom intensity (0-10 scale)  Last 24 hours: 4/10  2.5/10 at rest  How often do you feel symptoms?  100% of the time  Description: aching  Aggravating factors: reaching, upper body dressing/grooming, and lower body dressing/grooming  Alleviating factors: rest, compression at the elbow       Surgery: Date 8/1/22 Left Lateral epicondyle debridement and Tendon reattachment    Therapy precautions: Tendon precautions    History of injury/onset : April 2021: fell when leg gave out and he hit a metal container with his elbow  Total Direct Treatment Time:  45 min                       Total In Office Time: 45 min  Preferred Name:  [facility]     Current Symptoms/Chief complaints:   Chief Complaint   Patient presents with    Elbow Pain     States lateral elbow pain persisting with reaching and holding a cup  OBJECTIVE     QUICK DASH 9/29/22: 35 = 54.55% functional deficit              11/10/22: 29 = 40.91% functional deficit            9/8/22 9/29/22  Elbow extension/flexion L: -26/118°  6/130 4/137   Supination/Pronation L: 88/90°   90/90     Wrist Extension/Flexion 59/74 64/80 60/73    RD/UD   21/23 Strength  Strength (psi): RIGHT LEFT 9/8/22 LEFT 9/29/22 LEFT 11/10/22 LEFT 12/2/22    Position II (elbow 90) 100 60 65 80 90   Lat Pinch: 27 21 20 25 23   2pt pinch: 14 13 19 20 18   3pt pinch: 23 13 16       17 15      Treatment:  Therapeutic exercise (93571) x 45 min:  UBE L 4, 8 minutes  Putty stab blue flex bar into large new amount yellow, with wrist flex/ext repetitions (wrist neutral rotation)  Full weightbear into large amount new yellow putty  Gripper and pegs:  4th prong, 2 boards  Pro sup with green flex bar  Biceps all 3 planes: supinated only due to discomfort with neutral and pronated at lateral head  12 lbs, 3 sets 12  Triceps x 60: 30 lbs      ASSESSMENT     Some increased lateral elbow pain persisting. Did not request modalities at end session. PLAN     Continue with OT for focus on strengthening, PRE/UE endurance and RTW normal activity level. GOALS       Long term goals: 1/11/2022  (8 weeks)  Pt will increase elbow, wrist AROM to WNL in order to brandon/doff clothing without difficulty. MET  Pt will increase  strength to 80 with pain no greater than 2/10 in the elbow to increase functional use of the distal UE for heavy lifting/pushing/pulling (added 9/29/22) MET  Pt will be able to lift and carry >25 lbs with c/o pain no greater than a 2/10. (Upgraded weight)  Pt will improve Quick DASH score indicating a functional deficit of 19% or less  Pt will be I with HEP for ROM and strengthening as indicated at time of discharge. [employer] Portal     OT Protocols  Access Code: [email or web address]  URL: [email or web address]/  Date: 08/16/2022  Prepared by: Ariane Frees    Exercises  Seated Scapular Retraction - 5 x daily - 7 x weekly - 2-3 sets - 15 reps - 3 sec hold  Seated Shoulder Flexion Towel Slide at Table Top - 5 x daily - 7 x weekly - 2-3 sets - 15 reps - 5 sec hold  Isometric Shoulder Flexion at Wall - 2 x daily - 7 x weekly - 1-2 sets - 10 reps - 10 sec hold  Standing Isometric Shoulder Internal Rotation at Doorway - 2 x daily - 7 x weekly - 1-2 sets - 10 reps - 10 sec hold  Isometric Shoulder External Rotation at Wall - 2 x daily - 7 x weekly - 1-2 sets - 10 reps - 10 sec hold  Isometric Shoulder Extension at Wall - 2 x daily - 7 x weekly - 1-2 sets - 10 reps - 10 sec hold

## 2023-01-18 ENCOUNTER — TELEPHONE (OUTPATIENT)
Dept: ORTHOPEDIC SURGERY | Age: 48
End: 2023-01-18

## 2023-01-18 ENCOUNTER — OFFICE VISIT (OUTPATIENT)
Dept: ORTHOPEDIC SURGERY | Age: 48
End: 2023-01-18

## 2023-01-18 DIAGNOSIS — M77.12 LEFT LATERAL EPICONDYLITIS: Primary | ICD-10-CM

## 2023-01-18 DIAGNOSIS — M19.022 ARTHRITIS OF LEFT ELBOW: ICD-10-CM

## 2023-01-18 NOTE — PROGRESS NOTES
Orthopaedic Hand Surgery Note    Name: Aurea Or  Age: 56 y.o. YOB: 1975  Gender: male  MRN: [id number]    Post Operative Visit: Left lateral epicondylitis debridement with tendon reattachement - Left    HPI: Patient is status post Left lateral epicondylitis debridement with tendon reattachement - Left on 8/1/2022. Patient reports he is about 70% better compared to before surgery but he continues to have pain on the left elbow especially when lifting things over 20 pounds, he feels unable to perform all work duties. Physical Examination:  Well-healed surgical wounds. Sensation is intact in all fingers. Motor exam reveals no deficits. Mild tenderness palpation of the lateral epicondyle and the radiocapitellar joint, he has near full elbow motion with some pain at the extremes. Imaging:     none    Assessment:   1. Left lateral epicondylitis    2. Arthritis of left elbow         Status post Left lateral epicondylitis debridement with tendon reattachement - Left on 8/1/2022    Plan:  We discussed the post operative course and progression. Resume work with no lifting, pushing or pulling more than 15 pounds until the functional capacity evaluation is obtained, after that his restrictions will be dictated by the functional capacity evaluation. We discussed that as evidenced on the operative note there is some arthritis in the radiocapitellar joint so there is a high possibility that he will require future treatments including repeat steroid injections, anti-inflammatories, possible therapy and possible surgery in the future to address the arthritis. According to the [facility] guides to the evaluation of permanent impairment sixth edition patient has a left upper extremity impairment of 5% (table 15-4, page [id number]).     Aurea Daniels MD  01/18/23  3:35 PM

## 2023-01-18 NOTE — LETTER
[address] [address]  Phone: [phone]  Fax: [phone]    Harini Jamison MD        January 18, 2023     Patient: Harini Jamison   YOB: 1975   Date of Visit: 1/18/2023       To Whom It May Concern: It is my medical opinion that Katelyn Osvaldo can return back to work restricted to no lifting, pushing, or pulling with the left upper extremity over 15 lbs until FCE order after that restrictions will be modified per FCE. If you have any questions or concerns, please don't hesitate to call.     Sincerely,        Harini Jamison MD

## 2023-02-22 ENCOUNTER — OFFICE VISIT (OUTPATIENT)
Dept: ORTHOPEDIC SURGERY | Age: 48
End: 2023-02-22

## 2023-02-22 ENCOUNTER — TELEPHONE (OUTPATIENT)
Dept: ORTHOPEDIC SURGERY | Age: 48
End: 2023-02-22

## 2023-02-22 DIAGNOSIS — M19.022 ARTHRITIS OF LEFT ELBOW: ICD-10-CM

## 2023-02-22 DIAGNOSIS — M77.12 LEFT LATERAL EPICONDYLITIS: Primary | ICD-10-CM

## 2023-02-22 NOTE — TELEPHONE ENCOUNTER
Zen Rios have you received a FCE for this pt? He came in for it and nothing on file.          Thank you

## 2023-02-22 NOTE — LETTER
[address] [address]  Phone: [phone]  Fax: [phone]    Caridad Moreno MD        February 22, 2023     Patient: Caridad Srivastavajanet   YOB: 1975   Date of Visit: 2/22/2023       To Whom It May Concern: It is my medical opinion that Katelyn Osvaldo return to work with no restrictions. If you have any questions or concerns, please don't hesitate to call.     Sincerely,        Caridad Moreno MD

## 2023-02-26 NOTE — PROGRESS NOTES
Orthopaedic Hand Surgery Note    Name: Marieperry Moonsanjana  Age: 56 y.o. YOB: 1975  Gender: male  MRN: [id number]    Post Operative Visit: Left lateral epicondylitis debridement with tendon reattachement - Left    HPI: Patient is status post Left lateral epicondylitis debridement with tendon reattachement - Left on 8/1/2022. 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 Overall his pain has been much more tolerable since surgery. Physical Examination:  Well-healed surgical wounds. Sensation is intact in all fingers. Motor exam reveals no deficits. Mild tenderness palpation of the lateral epicondyle and the radiocapitellar joint, he has near full elbow motion with some pain at the extremes. Imaging:     none    Assessment:   1. Left lateral epicondylitis    2. Arthritis of left elbow         Status post Left lateral epicondylitis debridement with tendon reattachement - Left on 8/1/2022    Plan:  We discussed the post operative course and progression. Activities as tolerated, I reviewed the FCU which demonstrates he is able to perform work at a medium physical demand category. His work restrictions moving forward will be dictated by the [facility]. Overall time of this visit taking into account reviewing the chart, face to face time with the patient counseling on his condition as well as documentation after the chart was over 30 minutes.     Marie Zuluaga MD  02/26/23  9:51 AM

## (undated) DEVICE — BANDAGE,GAUZE,CONFORMING,2"X75",STRL,LF: Brand: MEDLINE INDUSTRIES, INC.

## (undated) DEVICE — BNDG,ELSTC,MATRIX,STRL,2"X5YD,LF,HOOK&LP: Brand: MEDLINE

## (undated) DEVICE — SUTURE VCRL SZ 3-0 L27IN ABSRB UD L26MM SH 1/2 CIR J416H

## (undated) DEVICE — STRIP,CLOSURE,WOUND,MEDI-STRIP,1/2X4: Brand: MEDLINE

## (undated) DEVICE — GLOVE ORANGE PI 7 1/2   MSG9075

## (undated) DEVICE — SUTURE VCRL SZ 4-0 L27IN ABSRB UD L19MM PS-2 3/8 CIR PRIM J426H

## (undated) DEVICE — PADDING CAST W3INXL4YD COT BLEND MIC PLEAT UNDERCAST SPEC

## (undated) DEVICE — TUBING, SUCTION, 1/4" X 10', STRAIGHT: Brand: MEDLINE

## (undated) DEVICE — COTTON UNDERCAST PADDING,REGULAR FINISH: Brand: WEBRIL

## (undated) DEVICE — TOPAZ EZ MICRODEBRIDER COBLATION WAND WITH INTEGRATED FINGER SWITCH IFS: Brand: COBLATION

## (undated) DEVICE — SOLUTION IRRIG 1000ML 09% SOD CHL USP PIC PLAS CONTAINER

## (undated) DEVICE — INTENT TO BE USED WITH SUTURE MATERIAL FOR TISSUE CLOSURE: Brand: RICHARD-ALLAN®  NEEDLE 1/2 CIRCLE REVERSE CUTTING

## (undated) DEVICE — SUTURE VCRL SZ 0 L36IN ABSRB UD L36MM CT-1 1/2 CIR J946H

## (undated) DEVICE — HAND PACK: Brand: MEDLINE INDUSTRIES, INC.

## (undated) DEVICE — DISPOSABLE KIT FOR 1.8 MM Q-FIX                                    IMPLANT, INCLUDES DRILL, DRILL GUIDE                                    AND OBTURATOR: Brand: Q-FIX

## (undated) DEVICE — BNDG,ELSTC,MATRIX,STRL,4"X5YD,LF,HOOK&LP: Brand: MEDLINE

## (undated) DEVICE — ZIMMER® STERILE DISPOSABLE TOURNIQUET CUFF WITH PLC, DUAL PORT, SINGLE BLADDER, 24 IN. (61 CM)

## (undated) DEVICE — SPLINT ORTH W5XL30IN PLSTR OF PARIS LO EXOTHERM SMOOTH

## (undated) DEVICE — SUTURE STRATAFIX SPRL MCRYL + SZ 4-0 L12IN ABSRB UD PS-2 SXMP1B117

## (undated) DEVICE — DISPOSABLE BIPOLAR CODE, 12' (3.66 M): Brand: CONMED

## (undated) DEVICE — BNDG,ELSTC,MATRIX,STRL,6"X5YD,LF,HOOK&LP: Brand: MEDLINE

## (undated) DEVICE — BNDG,ELSTC,MATRIX,STRL,3"X5YD,LF,HOOK&LP: Brand: MEDLINE

## (undated) DEVICE — MASTISOL ADHESIVE LIQ 2/3ML